# Patient Record
Sex: FEMALE | Race: WHITE | NOT HISPANIC OR LATINO | Employment: FULL TIME | URBAN - METROPOLITAN AREA
[De-identification: names, ages, dates, MRNs, and addresses within clinical notes are randomized per-mention and may not be internally consistent; named-entity substitution may affect disease eponyms.]

---

## 2017-09-13 ENCOUNTER — OFFICE VISIT (OUTPATIENT)
Dept: INTERNAL MEDICINE | Facility: CLINIC | Age: 47
End: 2017-09-13
Payer: COMMERCIAL

## 2017-09-13 VITALS
BODY MASS INDEX: 19.51 KG/M2 | SYSTOLIC BLOOD PRESSURE: 100 MMHG | WEIGHT: 124.31 LBS | TEMPERATURE: 98 F | RESPIRATION RATE: 16 BRPM | HEART RATE: 66 BPM | OXYGEN SATURATION: 99 % | HEIGHT: 67 IN | DIASTOLIC BLOOD PRESSURE: 60 MMHG

## 2017-09-13 DIAGNOSIS — G43.009 NONINTRACTABLE MIGRAINE, UNSPECIFIED MIGRAINE TYPE: Primary | ICD-10-CM

## 2017-09-13 PROCEDURE — 96372 THER/PROPH/DIAG INJ SC/IM: CPT | Mod: S$GLB,,, | Performed by: INTERNAL MEDICINE

## 2017-09-13 PROCEDURE — 99213 OFFICE O/P EST LOW 20 MIN: CPT | Mod: 25,S$GLB,, | Performed by: INTERNAL MEDICINE

## 2017-09-13 PROCEDURE — 3008F BODY MASS INDEX DOCD: CPT | Mod: S$GLB,,, | Performed by: INTERNAL MEDICINE

## 2017-09-13 RX ORDER — AMITRIPTYLINE HYDROCHLORIDE 25 MG/1
25 TABLET, FILM COATED ORAL NIGHTLY
Qty: 30 TABLET | Refills: 11 | Status: SHIPPED | OUTPATIENT
Start: 2017-09-13 | End: 2018-01-05

## 2017-09-13 RX ORDER — KETOROLAC TROMETHAMINE 30 MG/ML
15 INJECTION, SOLUTION INTRAMUSCULAR; INTRAVENOUS
Status: DISCONTINUED | OUTPATIENT
Start: 2017-09-13 | End: 2017-09-13

## 2017-09-13 RX ORDER — KETOROLAC TROMETHAMINE 30 MG/ML
30 INJECTION, SOLUTION INTRAMUSCULAR; INTRAVENOUS
Status: COMPLETED | OUTPATIENT
Start: 2017-09-13 | End: 2017-09-13

## 2017-09-13 RX ORDER — OXYMETAZOLINE HYDROCHLORIDE 1 G/100G
CREAM TOPICAL
Refills: 2 | COMMUNITY
Start: 2017-06-19 | End: 2018-01-05

## 2017-09-13 RX ADMIN — KETOROLAC TROMETHAMINE 30 MG: 30 INJECTION, SOLUTION INTRAMUSCULAR; INTRAVENOUS at 03:09

## 2017-09-13 NOTE — PROGRESS NOTES
"Subjective:      Patient ID: Mandi Juan is a 47 y.o. female.    Chief Complaint: Migraine (3 days); Pain (both eyes, 3 days); Fatigue; and Otalgia (right ear 3 days)    HPI: 47 y.o. White female,  with a  and 2 children who have migraine headaches.  She has had then for years, and can usually manage through one, but for 3-4 days now she has had  A right sided neck tightness, going into occipital area, around to her face and sitting behind right eye.  She took a sudafed this am , and thinks the pain maybe less. ( Also took Ibuprofen ).  Maxalt,caffergot, etc all caused tachycardia, and she felt " horrible".        Review of Systems   Constitutional: Positive for unexpected weight change. Negative for activity change.   HENT: Negative for hearing loss, rhinorrhea and trouble swallowing.    Eyes: Negative for discharge and visual disturbance.   Respiratory: Negative for chest tightness and wheezing.    Cardiovascular: Negative for chest pain and palpitations.   Gastrointestinal: Positive for constipation. Negative for blood in stool, diarrhea and vomiting.   Endocrine: Negative for polydipsia and polyuria.   Genitourinary: Negative for difficulty urinating, dysuria, hematuria and menstrual problem.   Musculoskeletal: Positive for neck pain. Negative for arthralgias and joint swelling.   Skin: Negative.    Neurological: Positive for headaches. Negative for weakness.   Psychiatric/Behavioral: Negative for confusion and dysphoric mood.       Objective:   /60 (BP Location: Right arm, Patient Position: Sitting, BP Method: Large (Manual))   Pulse 66   Temp 97.9 °F (36.6 °C) (Oral)   Resp 16   Ht 5' 7" (1.702 m)   Wt 56.4 kg (124 lb 5.4 oz)   LMP 09/09/2017   SpO2 99%   BMI 19.47 kg/m²     Physical Exam   Constitutional: She is oriented to person, place, and time. She appears well-developed and well-nourished.   HENT:   Head: Normocephalic and atraumatic.   Right Ear: External ear normal. "   Left Ear: External ear normal.   Nose: Nose normal.   Mouth/Throat: Oropharynx is clear and moist.   Eyes: Conjunctivae are normal. Pupils are equal, round, and reactive to light.   Neck: Neck supple.   Cardiovascular: Normal rate, regular rhythm and normal heart sounds.    Pulmonary/Chest: Effort normal and breath sounds normal.   Musculoskeletal: Normal range of motion.   Neurological: She is alert and oriented to person, place, and time.   Skin: Skin is warm and dry.   Psychiatric: She has a normal mood and affect. Her behavior is normal. Judgment and thought content normal.       Assessment:     1. Nonintractable migraine, unspecified migraine type      Plan:     Nonintractable migraine, unspecified migraine type  -     ketorolac injection 15 mg; Inject 15 mg into the vein one time.  -     amitriptyline (ELAVIL) 25 MG tablet; Take 1 tablet (25 mg total) by mouth every evening.  Dispense: 30 tablet; Refill: 11    Migraine avoidance, foods, etc...

## 2017-09-13 NOTE — PATIENT INSTRUCTIONS
Preventing Migraine Headaches: Medicines and Lifestyle Changes     Going to bed and getting up at the same time each day, including weekends, may help prevent migraines.     A migraine is a type of severe headache. Having a migraine can be very painful. But there are steps you can take to help prevent migraines.  Medicines to help prevent migraines  · Your healthcare provider may prescribe certain medicines to help prevent migraines. These medicines may need to be taken daily. Or they may only need to be taken at times when youre likely to have a migraine.  · Common medicines used to help prevent migraines include:  ¨ Triptans (serotonin receptor agonists)  ¨ Nonsteroidal anti-inflammatory drugs (available over-the-counter)  ¨ Beta-blockers  ¨ Anticonvulsants  ¨ Tricyclic antidepressants  ¨ Calcium channel blockers  ¨ Certain vitamins, minerals, and plant extracts  ¨ Botulinum toxin injection (Botox) for certain chronic migraines   ¨ CGRP (calcitonin gene-related peptide) agnonists are being reviewed by the Food and Drug Administration (FDA)  Lifestyle changes for long-term prevention  Here are some suggestions:  · Exercise. Regular exercise can help prevent migraines and improve your health. (If exercise triggers your migraines, talk to your healthcare provider.)  · Keep regular habits. Dont skip or delay meals. Drink plenty of water. And go to bed and get up at about the same time each day. This includes weekends.  · Try alternative treatments. These are treatments that do not involve the use of medicines or surgery. They may help relieve symptoms and prevent migraines. Some treatment options include biofeedback and acupuncture. Ask your healthcare provider to tell you more about these treatments if you have questions.  · Limit caffeine. You may find that caffeine helps relieve pain during an attack. But too much caffeine can also trigger migraines. So, limit the amount of caffeine you consume.  Date Last  Reviewed: 10/11/2015  © 7094-9374 [employer]. [address] [place] [address], [place] [address]. All rights reserved. This information is not intended as a substitute for professional medical care. Always follow your healthcare professional's instructions.        Preventing Migraine Headaches: Triggers     Red wine is a common migraine trigger.     The first step in preventing migraines is to learn what triggers them. You may then be able to control your triggers to avoid or reduce the severity of your migraines.  Know your triggers  Be aware that you may have more than one trigger, and that some triggers may work together. Common migraine triggers include:  · Food and nutrition. Skipping meals or not drinking enough water can trigger headaches. So can certain foods, such as caffeine, monosodium glutamate (MSG), aged cheese, or sausage.  · Alcohol. Red wine and other alcoholic beverages are common migraine triggers.  · Chemicals. Scents, cleaning products, gasoline, glue, perfume, and paint can be triggers. So can tobacco smoke, including secondhand smoke.  · Emotions. Stress can trigger headaches or make them worse once they begin.  · Sleep disruption. Staying up late, sleeping late, and traveling across time zones can disrupt your sleep cycle, triggering headaches.  · Hormones. Many women notice that migraines tend to happen at a certain point in their menstrual cycle. Birth control pills or hormone replacement therapy may also trigger migraines.  · Environment and weather. Air travel, changes in altitude, air pressure changes, hot sun, or bright or flashing lights can be triggers.  Control your triggers  These are some of the things you can do to try to control triggers:  · Avoid triggers if you can. For example, stay clear of alcohol and foods that trigger your headaches. Use unscented household products. Keep regular sleep habits. Manage stress to help control emotional triggers.  · Change your behavior  at times when triggers can't be avoided. For example, make sure to get enough rest and drink plenty of water while you're traveling. Make sure to carry a hat, sunglasses, and your medicines. Be alert for migraine symptoms, so you can treat a migraine early if it happens.  Date Last Reviewed: 10/9/2015  © 5709-9095 [employer]. [address] [place] [address], [place] [address]. All rights reserved. This information is not intended as a substitute for professional medical care. Always follow your healthcare professional's instructions.

## 2017-10-31 ENCOUNTER — OFFICE VISIT (OUTPATIENT)
Dept: NEUROLOGY | Facility: CLINIC | Age: 47
End: 2017-10-31
Payer: COMMERCIAL

## 2017-10-31 VITALS
BODY MASS INDEX: 19.62 KG/M2 | WEIGHT: 125 LBS | HEIGHT: 67 IN | SYSTOLIC BLOOD PRESSURE: 110 MMHG | DIASTOLIC BLOOD PRESSURE: 68 MMHG

## 2017-10-31 DIAGNOSIS — G43.109 MIGRAINE WITH AURA AND WITHOUT STATUS MIGRAINOSUS, NOT INTRACTABLE: Primary | ICD-10-CM

## 2017-10-31 PROCEDURE — 99204 OFFICE O/P NEW MOD 45 MIN: CPT | Mod: S$GLB,,, | Performed by: PSYCHIATRY & NEUROLOGY

## 2017-10-31 PROCEDURE — 99999 PR PBB SHADOW E&M-EST. PATIENT-LVL III: CPT | Mod: PBBFAC,,, | Performed by: PSYCHIATRY & NEUROLOGY

## 2017-10-31 RX ORDER — IBUPROFEN 200 MG
200 TABLET ORAL EVERY 6 HOURS PRN
COMMUNITY
End: 2019-10-22

## 2017-10-31 RX ORDER — FLUTICASONE PROPIONATE 50 UG/1
POWDER, METERED RESPIRATORY (INHALATION)
COMMUNITY
End: 2017-10-31 | Stop reason: CLARIF

## 2017-10-31 RX ORDER — FLUTICASONE PROPIONATE 50 MCG
1 SPRAY, SUSPENSION (ML) NASAL DAILY
COMMUNITY
End: 2022-09-21

## 2017-10-31 RX ORDER — KETOROLAC TROMETHAMINE 10 MG/1
10 TABLET, FILM COATED ORAL 2 TIMES DAILY PRN
Qty: 20 TABLET | Refills: 5 | Status: SHIPPED | OUTPATIENT
Start: 2017-10-31 | End: 2018-04-03

## 2017-10-31 RX ORDER — PROCHLORPERAZINE MALEATE 10 MG
10 TABLET ORAL 2 TIMES DAILY PRN
Qty: 20 TABLET | Refills: 5 | Status: SHIPPED | OUTPATIENT
Start: 2017-10-31 | End: 2023-11-09 | Stop reason: ALTCHOICE

## 2017-10-31 NOTE — PROGRESS NOTES
HPI: Mandi Juan is a 47 y.o. female with headache which started 8 year(s) ago.  Her first headache was with visual aura of scotoma. She also sees jagged mirrors at time.   The pain usually starts in the right more than left temple or  Neck and occipita then frontal region near the eye.   Pain escalates to 8/10 on a pain scale and is described as Throbbing.  Associated symptoms include  light sensitivity and nausea  none. There are not associated neurological sx such as focal weakness or numbness or autonomic symptoms. The pain lasts for day(s). She uses OTC NSAIDs PRN.  She has tried maxalt and relpax and imitrex with jaw tightness and sadness side effects.  Current Frequency is  1 headache every 2-3 weeks which lasts 2-4 days.   No prior MRI brain, but had CT scan 8 years ago which she states was normal.  She uses verapamil for heart flutter  Toradol IM has helped.   She refused to take elavil due to a poor reaction (anxiety) with Zoloft.   Migraines run in her family.   She works as an  for [employer].     Review of Systems   Constitutional: Negative for fever.   HENT: Negative for nosebleeds.    Eyes: Negative for double vision.   Respiratory: Negative for hemoptysis.    Cardiovascular: Negative for leg swelling.   Gastrointestinal: Negative for blood in stool.   Genitourinary: Negative for hematuria.   Musculoskeletal: Negative for falls.   Skin: Negative for rash.   Neurological: Negative for sensory change.   Psychiatric/Behavioral: The patient does not have insomnia.          I have reviewed all of this patient's past medical and surgical histories as well as family and social histories and active allergies and medications as documented in the electronic medical record.      Exam:  Gen Appearance, well developed/nourished in no apparent distress  CV: 2+ distal pulses with no edema or swelling  Neuro:  MS: Awake, alert, oriented to place, person, time, situation. Sustains attention.  Recent/remote memory intact, Language is full to spontaneous speech/naming/comprehension. Fund of Knowledge is full  CN: Optic discs are flat with normal vasculature, PERRL, Extraoccular movements and visual fields are full. Normal facial sensation and strength, Hearing symmetric, Tongue and Palate are midline and strong. Shoulder Shrug symmetric and strong.  Motor: Normal bulk, tone, no abnormal movements. 5/5 strength bilateral upper/lower extremities with 2+ reflexes and no clonus  Sensory: symmetric to pain, temp, and vibration/proprioception. Romberg negative  Cerebellar: Finger-nose,Heal-shin, Rapid alternating movements intact  Gait: Normal stance, no ataxia          Assessment/Plan: Mandi Juan is a 47 y.o. female with an 8 year history of migraine with aura, episodic, not intractable.     I recommend:   1. Toradol (unless worsening GERD or GI upset)- also discussed renal and black box CV risks- don't mix with OTC NSAIDs. For rescue, compazine trial unless abnormal movements or sedation. Notify me if no relief with 3 trials of these medications or intolerable side effects.   2. She refused to take elavil due to a poor reaction (anxiety) with Zoloft and fear of side effects.  She has tried maxalt and relpax and imitrex with jaw tightness and sadness side effects.  3. She uses verapamil for heart flutter which may be acting like a good prevention medication. Continue and avoid further prevention medications unless headache frequency worsens.   4. Avoid brain imaging given her reassuring neuro exam and episodic nature of headaches.   RTC in 4-6 months

## 2018-01-05 ENCOUNTER — OFFICE VISIT (OUTPATIENT)
Dept: INTERNAL MEDICINE | Facility: CLINIC | Age: 48
End: 2018-01-05
Payer: COMMERCIAL

## 2018-01-05 VITALS
BODY MASS INDEX: 20.16 KG/M2 | TEMPERATURE: 98 F | RESPIRATION RATE: 16 BRPM | OXYGEN SATURATION: 98 % | WEIGHT: 128.44 LBS | SYSTOLIC BLOOD PRESSURE: 120 MMHG | DIASTOLIC BLOOD PRESSURE: 72 MMHG | HEIGHT: 67 IN | HEART RATE: 76 BPM

## 2018-01-05 DIAGNOSIS — B97.89 VIRAL CROUP: ICD-10-CM

## 2018-01-05 DIAGNOSIS — J05.0 VIRAL CROUP: ICD-10-CM

## 2018-01-05 DIAGNOSIS — J04.2 LARYNGOTRACHEITIS: Primary | ICD-10-CM

## 2018-01-05 PROCEDURE — 99213 OFFICE O/P EST LOW 20 MIN: CPT | Mod: S$GLB,,, | Performed by: INTERNAL MEDICINE

## 2018-01-05 PROCEDURE — 99999 PR PBB SHADOW E&M-EST. PATIENT-LVL IV: CPT | Mod: PBBFAC,,, | Performed by: INTERNAL MEDICINE

## 2018-01-05 RX ORDER — PREDNISONE 10 MG/1
10 TABLET ORAL DAILY
Qty: 10 TABLET | Refills: 0 | Status: SHIPPED | OUTPATIENT
Start: 2018-01-05 | End: 2018-01-15

## 2018-01-05 RX ORDER — BENZONATATE 200 MG/1
200 CAPSULE ORAL 2 TIMES DAILY PRN
Qty: 20 CAPSULE | Refills: 0 | Status: SHIPPED | OUTPATIENT
Start: 2018-01-05 | End: 2018-01-12

## 2018-01-05 NOTE — PROGRESS NOTES
"Subjective:      Patient ID: Mandi Juan is a 47 y.o. female.    Chief Complaint: Cough (X 6 days); Fatigue; Headache; Sore Throat; Chest Pain (heaviness ); and Pain (both ribs)    HPI: 47 y.o. White female, has had a non stop dry cough, deep in chest, that has given her pain in the  Ribs. She now has a laryngitis, associated with a headache and fatigue.  Codeine, decongestants give her heart palpitations and the jitters.      Review of Systems   Constitutional: Positive for fatigue. Negative for chills, diaphoresis and fever.   HENT: Positive for congestion, rhinorrhea, sinus pressure, sneezing, sore throat and voice change. Negative for ear pain.    Eyes: Negative for photophobia, redness and visual disturbance.   Respiratory: Negative for cough, chest tightness, shortness of breath and wheezing.    Cardiovascular: Negative for chest pain and palpitations.   Gastrointestinal: Negative.    Genitourinary: Negative.    Musculoskeletal: Negative.    Skin: Negative.    Neurological: Positive for headaches.   Hematological: Negative for adenopathy.   Psychiatric/Behavioral: Negative.        Objective:   /72 (BP Location: Left arm, Patient Position: Sitting, BP Method: Medium (Manual))   Pulse 76   Temp 98.2 °F (36.8 °C) (Oral)   Resp 16   Ht 5' 7" (1.702 m)   Wt 58.3 kg (128 lb 6.7 oz)   LMP 12/30/2017   SpO2 98%   BMI 20.11 kg/m²     Physical Exam   Constitutional: She appears well-developed and well-nourished. No distress.   HENT:   Head: Normocephalic and atraumatic.   Right Ear: External ear and ear canal normal. Tympanic membrane is injected, erythematous and retracted.   Left Ear: External ear and ear canal normal. Tympanic membrane is injected, erythematous and retracted.   Nose: Mucosal edema and rhinorrhea present.   Mouth/Throat: Uvula is midline. Mucous membranes are pale. Posterior oropharyngeal edema and posterior oropharyngeal erythema present. Tonsils are 1+ on the right. Tonsils are 1+ on " the left. No tonsillar exudate.   Eyes: Conjunctivae are normal. Pupils are equal, round, and reactive to light.   Neck: Normal range of motion.   Cardiovascular: Regular rhythm and normal heart sounds.    Pulmonary/Chest: Effort normal and breath sounds normal.   Musculoskeletal: She exhibits no edema.   Lymphadenopathy:     She has no cervical adenopathy.   Neurological: She is alert.   Psychiatric: She has a normal mood and affect.   Nursing note and vitals reviewed.      Assessment:     1. Laryngotracheitis    2. Viral croup    Limited treatment due to side effects.    Plan:     Laryngotracheitis  -     predniSONE (DELTASONE) 10 MG tablet; Take 1 tablet (10 mg total) by mouth once daily.  Dispense: 10 tablet; Refill: 0  -     benzonatate (TESSALON) 200 MG capsule; Take 1 capsule (200 mg total) by mouth 2 (two) times daily as needed for Cough.  Dispense: 20 capsule; Refill: 0    Viral croup    Fluids,rest,isolation,time.  If no better 5 days plz return.

## 2018-03-03 ENCOUNTER — OFFICE VISIT (OUTPATIENT)
Dept: URGENT CARE | Facility: CLINIC | Age: 48
End: 2018-03-03
Payer: COMMERCIAL

## 2018-03-03 VITALS
HEART RATE: 68 BPM | DIASTOLIC BLOOD PRESSURE: 75 MMHG | WEIGHT: 128 LBS | OXYGEN SATURATION: 98 % | SYSTOLIC BLOOD PRESSURE: 120 MMHG | TEMPERATURE: 97 F | BODY MASS INDEX: 20.09 KG/M2 | HEIGHT: 67 IN | RESPIRATION RATE: 18 BRPM

## 2018-03-03 DIAGNOSIS — N30.00 ACUTE CYSTITIS WITHOUT HEMATURIA: Primary | ICD-10-CM

## 2018-03-03 DIAGNOSIS — N76.1 SUBACUTE VAGINITIS: ICD-10-CM

## 2018-03-03 LAB
BILIRUB UR QL STRIP: POSITIVE
GLUCOSE UR QL STRIP: NEGATIVE
KETONES UR QL STRIP: NEGATIVE
LEUKOCYTE ESTERASE UR QL STRIP: NEGATIVE
PH, POC UA: 6 (ref 5–8)
POC BLOOD, URINE: NEGATIVE
POC NITRATES, URINE: POSITIVE
PROT UR QL STRIP: POSITIVE
SP GR UR STRIP: 1.01 (ref 1–1.03)
UROBILINOGEN UR STRIP-ACNC: ABNORMAL (ref 0.1–1.1)

## 2018-03-03 PROCEDURE — 81003 URINALYSIS AUTO W/O SCOPE: CPT | Mod: QW,S$GLB,, | Performed by: EMERGENCY MEDICINE

## 2018-03-03 PROCEDURE — 99214 OFFICE O/P EST MOD 30 MIN: CPT | Mod: 25,S$GLB,, | Performed by: EMERGENCY MEDICINE

## 2018-03-03 RX ORDER — PHENAZOPYRIDINE HYDROCHLORIDE 200 MG/1
200 TABLET, FILM COATED ORAL 3 TIMES DAILY PRN
Qty: 6 TABLET | Refills: 0 | Status: SHIPPED | OUTPATIENT
Start: 2018-03-03 | End: 2018-03-05

## 2018-03-03 RX ORDER — FLUCONAZOLE 150 MG/1
150 TABLET ORAL ONCE
Qty: 1 TABLET | Refills: 0 | Status: SHIPPED | OUTPATIENT
Start: 2018-03-03 | End: 2018-03-03

## 2018-03-03 RX ORDER — SULFAMETHOXAZOLE AND TRIMETHOPRIM 800; 160 MG/1; MG/1
1 TABLET ORAL 2 TIMES DAILY
Qty: 10 TABLET | Refills: 0 | Status: SHIPPED | OUTPATIENT
Start: 2018-03-03 | End: 2018-03-08

## 2018-03-03 NOTE — PATIENT INSTRUCTIONS
"Enoch Levi MD  Go to the Emergency Department for any problems  Call your PCP for follow up next available.    Bladder Infection, Female (Adult)    Urine is normally doesn't have any bacteria in it. But bacteria can get into the urinary tract from the skin around the rectum. Or they can travel in the blood from elsewhere in the body. Once they are in your urinary tract, they can cause infection in the urethra (urethritis), the bladder (cystitis), or the kidneys (pyelonephritis).  The most common place for an infection is in the bladder. This is called a bladder infection. This is one of the most common infections in women. Most bladder infections are easily treated. They are not serious unless the infection spreads to the kidney.  The phrases "bladder infection," "UTI," and "cystitis" are often used to describe the same thing. But they are not always the same. Cystitis is an inflammation of the bladder. The most common cause of cystitis is an infection.  Symptoms  The infection causes inflammation in the urethra and bladder. This causes many of the symptoms. The most common symptoms of a bladder infection are:  · Pain or burning when urinating  · Having to urinate more often than usual  · Urgent need to urinate  · Only a small amount of urine comes out  · Blood in urine  · Abdominal discomfort. This is usually in the lower abdomen above the pubic bone.  · Cloudy urine  · Strong- or bad-smelling urine  · Unable to urinate (urinary retention)  · Unable to hold urine in (urinary incontinence)  · Fever  · Loss of appetite  · Confusion (in older adults)  Causes  Bladder infections are not contagious. You can't get one from someone else, from a toilet seat, or from sharing a bath.  The most common cause of bladder infections is bacteria from the bowels. The bacteria get onto the skin around the opening of the urethra. From there, they can get into the urine and travel up to the bladder, causing inflammation and " infection. This usually happens because of:  · Wiping improperly after urinating. Always wipe from front to back.  · Bowel incontinence  · Pregnancy  · Procedures such as having a catheter inserted  · Older age  · Not emptying your bladder. This can allow bacteria a chance to grow in your urine.  · Dehydration  · Constipation  · Sex  · Use of a diaphragm for birth control   Treatment  Bladder infections are diagnosed by a urine test. They are treated with antibiotics and usually clear up quickly without complications. Treatment helps prevent a more serious kidney infection.  Medicines  Medicines can help in the treatment of a bladder infection:  · Take antibiotics until they are used up, even if you feel better. It is important to finish them to make sure the infection has cleared.  · You can use acetaminophen or ibuprofen for pain, fever, or discomfort, unless another medicine was prescribed. If you have chronic liver or kidney disease, talk with your healthcare provider before using these medicines. Also talk with your provider if you've ever had a stomach ulcer or gastrointestinal bleeding, or are taking blood-thinner medicines.  · If you are given phenazopydridine to reduce burning with urination, it will cause your urine to become a bright orange color. This can stain clothing.  Care and prevention  These self-care steps can help prevent future infections:  · Drink plenty of fluids to prevent dehydration and flush out your bladder. Do this unless you must restrict fluids for other health reasons, or your doctor told you not to.  · Proper cleaning after going to the bathroom is important. Wipe from front to back after using the toilet to prevent the spread of bacteria.  · Urinate more often. Don't try to hold urine in for a long time.  · Wear loose-fitting clothes and cotton underwear. Avoid tight-fitting pants.  · Improve your diet and prevent constipation. Eat more fresh fruit and vegetables, and fiber, and  less junk and fatty foods.  · Avoid sex until your symptoms are gone.  · Avoid caffeine, alcohol, and spicy foods. These can irritate your bladder.  · Urinate right after intercourse to flush out your bladder.  · If you use birth control pills and have frequent bladder infections, discuss it with your doctor.  Follow-up care  Call your healthcare provider if all symptoms are not gone after 3 days of treatment. This is especially important if you have repeat infections.  If a culture was done, you will be told if your treatment needs to be changed. If directed, you can call to find out the results.  If X-rays were done, you will be told if the results will affect your treatment.  Call 911  Call 911 if any of the following occur:  · Trouble breathing  · Hard to wake up or confusion  · Fainting or loss of consciousness  · Rapid heart rate  When to seek medical advice  Call your healthcare provider right away if any of these occur:  · Fever of 100.4ºF (38.0ºC) or higher, or as directed by your healthcare provider  · Symptoms are not better by the third day of treatment  · Back or belly (abdominal) pain that gets worse  · Repeated vomiting, or unable to keep medicine down  · Weakness or dizziness  · Vaginal discharge  · Pain, redness, or swelling in the outer vaginal area (labia)  Date Last Reviewed: 10/1/2016  © 1565-7696 [employer]. [address] [place] [address], [place] [address]. All rights reserved. This information is not intended as a substitute for professional medical care. Always follow your healthcare professional's instructions.        Preventing Vaginitis     Use mild, unscented soap when you bathe or shower to avoid irritating your vagina.    Vaginitis is irritation or infection of the vagina or vulva (the outside opening of the vagina). Vaginitis can be caused by bacteria, viruses, parasites, or yeast. Chemicals (such as in perfumes or soaps or in spermicides) can sometimes be a cause. Vaginitis  can be caused by hormone changes in pregnancy or with menopause. You can help prevent vaginitis. Follow the tips below. And see your healthcare provider if you have any symptoms.  Hygiene  · Avoid chemicals. Do not use vaginal sprays. Do not use scented toilet paper or tampons that are scented. Sprays and scents have chemicals that can irritate your vagina.  · Do not douche unless you are told to by your healthcare provider. Douching is rarely needed. And it upsets the normal balance in the vagina.  · Wash yourself well. Wash the outer vaginal area (vulva) every day with mild, unscented soap. Keep it as dry as possible.  · Wipe correctly. Make sure to wipe from front to back after a bowel movement. This helps keep from spreading bacteria from your anus to your vagina.  · Change your tampon often. During your period, make sure to change your tampon as often as directed on the package. This allows the normal flow of vaginal discharge and blood.  Lifestyle  · Limit your number of sexual partners. The more partners you have, the greater your risk of infection. Using condoms helps reduce your risk.  · Get enough sleep. Sleep helps keep your bodys immune system healthy. This helps you fight infection.  · Lose weight, if needed. Excess weight can reduce air circulation around your vagina. This can increase your risk of infection.  · Exercise regularly. Regular activity helps keep your body healthy.  · Take antibiotics only as directed. Antibiotics can change the normal chemical balance in the vagina.    Clothing  · Dont sit in wet clothes. Yeast thrives when its warm and damp.  · Dont wear tight pants. And dont wear tights, leggings, or hose without a cotton crotch. These types of clothing trap warmth and moisture.  · Wear cotton underwear. Cotton lets air circulate around the vagina.  Symptoms of vaginitis  · Irritation, swelling, or itching of the genital area  · Vaginal discharge  · Bad vaginal odor  · Pain or  burning during urination   Date Last Reviewed: 12/1/2016  © 2069-2739 [employer]. [address] [place] [address], [place] [address]. All rights reserved. This information is not intended as a substitute for professional medical care. Always follow your healthcare professional's instructions.

## 2018-03-03 NOTE — PROGRESS NOTES
"Subjective:       Patient ID: Mandi Juan is a 47 y.o. female.    Vitals:  height is 5' 7" (1.702 m) and weight is 58.1 kg (128 lb). Her oral temperature is 97.3 °F (36.3 °C). Her blood pressure is 120/75 and her pulse is 68. Her respiration is 18 and oxygen saturation is 98%.     Chief Complaint: Dysuria    Few hr hx sharp bladder area pains and discomfort at end of urination/urgency, no fever/back pain/vag Dc, hx of same, OK with Bactrim, occas vaginitis with antibiotic use, took azo PTA, NOC.      Dysuria    This is a recurrent problem. The current episode started today. The problem has been unchanged. The quality of the pain is described as aching. The pain is at a severity of 8/10. The pain is mild. There has been no fever. Associated symptoms include frequency and urgency. Pertinent negatives include no chills, hematuria, nausea or vomiting. Treatments tried: azo. The treatment provided no relief.     Review of Systems   Constitution: Negative for chills and fever.   Skin: Negative for itching.   Musculoskeletal: Negative for back pain.   Gastrointestinal: Negative for abdominal pain, nausea and vomiting.   Genitourinary: Positive for dysuria, frequency and urgency. Negative for genital sores, hematuria, missed menses and non-menstrual bleeding.       Objective:      Physical Exam   Constitutional: She is oriented to person, place, and time. She appears well-developed and well-nourished.   HENT:   Head: Normocephalic and atraumatic.   Neck: Normal range of motion. Neck supple.   Cardiovascular: Normal rate, regular rhythm and normal heart sounds.    Pulmonary/Chest: Breath sounds normal.   Abdominal: There is no guarding.   Mild bladder discomfort to palp, no bilat CVAT to palp   Musculoskeletal: Normal range of motion.   Neurological: She is alert and oriented to person, place, and time.   Skin: Skin is warm and dry.   Psychiatric: She has a normal mood and affect. Her behavior is normal.       Assessment: "       1. Acute cystitis without hematuria    2. Subacute vaginitis        Plan:         Acute cystitis without hematuria  -     POCT Urinalysis, Dipstick, Automated, W/O Scope  -     sulfamethoxazole-trimethoprim 800-160mg (BACTRIM DS) 800-160 mg Tab; Take 1 tablet by mouth 2 (two) times daily.  Dispense: 10 tablet; Refill: 0  -     phenazopyridine (PYRIDIUM) 200 MG tablet; Take 1 tablet (200 mg total) by mouth 3 (three) times daily as needed for Pain.  Dispense: 6 tablet; Refill: 0    Subacute vaginitis  -     fluconazole (DIFLUCAN) 150 MG Tab; Take 1 tablet (150 mg total) by mouth once. Take one time as needed for vaginal yeast infection  Dispense: 1 tablet; Refill: 0      Enoch Levi MD  Go to the Emergency Department for any problems  Call your PCP for follow up next available.

## 2018-03-06 ENCOUNTER — TELEPHONE (OUTPATIENT)
Dept: URGENT CARE | Facility: CLINIC | Age: 48
End: 2018-03-06

## 2018-04-03 ENCOUNTER — OFFICE VISIT (OUTPATIENT)
Dept: NEUROLOGY | Facility: CLINIC | Age: 48
End: 2018-04-03
Payer: COMMERCIAL

## 2018-04-03 VITALS
WEIGHT: 125.44 LBS | RESPIRATION RATE: 16 BRPM | BODY MASS INDEX: 19.69 KG/M2 | SYSTOLIC BLOOD PRESSURE: 100 MMHG | DIASTOLIC BLOOD PRESSURE: 66 MMHG | HEIGHT: 67 IN | HEART RATE: 74 BPM

## 2018-04-03 DIAGNOSIS — G43.109 MIGRAINE WITH AURA AND WITHOUT STATUS MIGRAINOSUS, NOT INTRACTABLE: Primary | ICD-10-CM

## 2018-04-03 PROCEDURE — 99999 PR PBB SHADOW E&M-EST. PATIENT-LVL III: CPT | Mod: PBBFAC,,, | Performed by: PSYCHIATRY & NEUROLOGY

## 2018-04-03 PROCEDURE — 99214 OFFICE O/P EST MOD 30 MIN: CPT | Mod: S$GLB,,, | Performed by: PSYCHIATRY & NEUROLOGY

## 2018-04-03 RX ORDER — DICLOFENAC POTASSIUM 50 MG/1
1 POWDER, FOR SOLUTION ORAL DAILY PRN
Qty: 9 EACH | Refills: 11 | Status: SHIPPED | OUTPATIENT
Start: 2018-04-03 | End: 2018-04-05

## 2018-04-03 NOTE — PROGRESS NOTES
HPI: Mandi Juan is a 47 y.o. female with over 8 year history of migraine with aura, episodic, not intractable.   Since the last visit she tried Toradol and compazine PRN for headaches  She states she has to use both medications in combination but has good relief with this. She feels she has good relief with compazine compared to Toradol. She will get relief within an hour.   She is taking 2 days of headache medications per month.  Some headaches are more on the left and feels like her face is throbbing and these are severe and last hours and are severe and just in a different location as opposed the headaches that start more posteriorly. She has no autonomic symptoms. These are not getting more frequent. She does often use ibuprofen  instead of Rx meds  She has not had days of headaches or more frequent headaches.     Review of Systems   Constitutional: Negative for fever.   HENT: Negative for nosebleeds.    Eyes: Negative for double vision.   Respiratory: Negative for hemoptysis.    Cardiovascular: Negative for leg swelling.   Gastrointestinal: Negative for blood in stool.   Genitourinary: Negative for hematuria.   Skin: Negative for rash.   Neurological: Positive for headaches.   Endo/Heme/Allergies: Does not bruise/bleed easily.   Psychiatric/Behavioral: Negative for memory loss.         I have reviewed all of this patient's past medical and surgical histories as well as family and social histories and active allergies and medications as documented in the electronic medical record.    Exam:  Gen Appearance, well developed/nourished in no apparent distress  CV: 2+ distal pulses with no edema or swelling  Neuro:  MS: Awake, alert, oriented to place, person, time, situation. Sustains attention. Recent/remote memory intact, Language is full to spontaneous speech/naming/comprehension. Fund of Knowledge is full  CN: Optic discs are flat with normal vasculature, PERRL, Extraoccular movements and visual fields are  full. Normal facial sensation and strength, Hearing symmetric, Tongue and Palate are midline and strong. Shoulder Shrug symmetric and strong.  Motor: Normal bulk, tone, no abnormal movements. 5/5 strength bilateral upper/lower extremities with 2+ reflexes and no clonus  Sensory: symmetric to pain, temp, and vibration/proprioception. Romberg negative  Cerebellar: Finger-nose,Heal-shin, Rapid alternating movements intact  Gait: Normal stance, no ataxia      Assessment/Plan: Mandi Juan is a 47 y.o. female with over 8 year history of migraine with aura, episodic, not intractable.     I recommend:   1. Toradol with Compazine in combination helps. Compazine helps her more.Try taking compazine  more at the onset of the headaches. Try diclofenac powder instead of Toradol unless NSAID side effects as discussed at the last visit.  Don't use with ibuprofen.   2. Reassured about more left sided headaches (this unilateral pattern is more common in migraine). No imaging needed as long as this does worsen/ become more frequent.   3. Previously, she refused to take elavil due to a poor reaction (anxiety) with Zoloft and fear of side effects.  She has tried maxalt and relpax and imitrex with jaw tightness and sadness side effects.  4. She uses verapamil for heart flutter which may be acting like a good prevention medication. Continue and avoid further prevention medications unless headache frequency worsens.   RTC 6 months

## 2018-04-05 ENCOUNTER — TELEPHONE (OUTPATIENT)
Dept: NEUROLOGY | Facility: CLINIC | Age: 48
End: 2018-04-05

## 2018-04-05 RX ORDER — DICLOFENAC POTASSIUM 50 MG/1
50 TABLET, FILM COATED ORAL 2 TIMES DAILY PRN
Qty: 20 TABLET | Refills: 3 | Status: SHIPPED | OUTPATIENT
Start: 2018-04-05 | End: 2018-10-12

## 2018-04-12 ENCOUNTER — OFFICE VISIT (OUTPATIENT)
Dept: INTERNAL MEDICINE | Facility: CLINIC | Age: 48
End: 2018-04-12
Payer: COMMERCIAL

## 2018-04-12 VITALS
BODY MASS INDEX: 20.03 KG/M2 | SYSTOLIC BLOOD PRESSURE: 102 MMHG | DIASTOLIC BLOOD PRESSURE: 68 MMHG | OXYGEN SATURATION: 99 % | HEIGHT: 67 IN | WEIGHT: 127.63 LBS | HEART RATE: 78 BPM

## 2018-04-12 DIAGNOSIS — M75.52 ACUTE BURSITIS OF LEFT SHOULDER: ICD-10-CM

## 2018-04-12 DIAGNOSIS — Z82.61 FAMILY HISTORY OF RHEUMATOID ARTHRITIS: ICD-10-CM

## 2018-04-12 DIAGNOSIS — M19.90 ARTHRITIS: Primary | ICD-10-CM

## 2018-04-12 PROCEDURE — 99999 PR PBB SHADOW E&M-EST. PATIENT-LVL III: CPT | Mod: PBBFAC,,, | Performed by: INTERNAL MEDICINE

## 2018-04-12 PROCEDURE — 99213 OFFICE O/P EST LOW 20 MIN: CPT | Mod: S$GLB,,, | Performed by: INTERNAL MEDICINE

## 2018-04-12 NOTE — PROGRESS NOTES
"Subjective:      Patient ID: Mandi Juan is a 47 y.o. female.    Chief Complaint: Hand Pain (Several Mths  ) and Shoulder Pain (left Shoulder)    HPI: 47 y.o. White female , with a strong family history of Rheumatoid arthritis.  She noticed episodic pain in her left MTP first joint, and then pain in the DIP joints   od left hand. This can come on without any trauma,exercise or extra use.  States it does not produce any redness,swelling; just painful.  And she has pain in her left shoulder, not limiting ROM  Tries not to take too much Ibuprofen because of her stomach.  She did see Dr. Colbert ( neuro) for her migraines, and was placed on toradol  And compazine, because all the other meds gave her side effects.         Review of Systems   Constitutional: Negative.    HENT: Negative.    Eyes: Negative.    Respiratory: Negative.    Cardiovascular: Negative.    Gastrointestinal: Negative.    Genitourinary: Negative.    Musculoskeletal: Negative.    Skin: Negative.    Neurological: Negative.    Hematological: Negative.    Psychiatric/Behavioral: Negative.        Objective:   /68 (BP Location: Left arm, Patient Position: Sitting)   Pulse 78   Ht 5' 7" (1.702 m)   Wt 57.9 kg (127 lb 10.3 oz)   SpO2 99%   BMI 19.99 kg/m²     Physical Exam   Constitutional: She is oriented to person, place, and time. She appears well-developed and well-nourished.   HENT:   Head: Normocephalic and atraumatic.   Right Ear: External ear normal.   Left Ear: External ear normal.   Nose: Nose normal.   Mouth/Throat: Oropharynx is clear and moist.   Eyes: Conjunctivae and EOM are normal. Pupils are equal, round, and reactive to light.   Neck: Normal range of motion. Neck supple.   Cardiovascular: Normal rate, regular rhythm and normal heart sounds.    Pulmonary/Chest: Effort normal and breath sounds normal.   Abdominal: Soft. Bowel sounds are normal.   Musculoskeletal: Normal range of motion.        Left shoulder: She exhibits " tenderness and pain. She exhibits no swelling, no crepitus and no spasm.        Arms:  Neurological: She is alert and oriented to person, place, and time.   Skin: Skin is warm and dry.   Psychiatric: She has a normal mood and affect.   Nursing note and vitals reviewed.      Assessment:     1. Arthritis    2. Family history of rheumatoid arthritis    3. Acute bursitis of left shoulder    She has seen Ines Powers ( ortho) in the past and wants to follow up with him.  Will check serology  For RA.  Plan:     Arthritis  -     C-reactive protein; Future; Expected date: 04/12/2018  -     Sedimentation rate, manual; Future; Expected date: 04/12/2018  -     Rheumatoid factor; Future; Expected date: 04/12/2018  -     CBC auto differential; Future; Expected date: 04/12/2018  -     Ambulatory referral to Orthopedics    Family history of rheumatoid arthritis  -     C-reactive protein; Future; Expected date: 04/12/2018  -     Sedimentation rate, manual; Future; Expected date: 04/12/2018  -     Rheumatoid factor; Future; Expected date: 04/12/2018  -     CBC auto differential; Future; Expected date: 04/12/2018  -     Ambulatory referral to Orthopedics    Acute bursitis of left shoulder

## 2018-08-29 DIAGNOSIS — Z12.31 VISIT FOR SCREENING MAMMOGRAM: Primary | ICD-10-CM

## 2018-10-12 ENCOUNTER — OFFICE VISIT (OUTPATIENT)
Dept: NEUROLOGY | Facility: CLINIC | Age: 48
End: 2018-10-12
Payer: COMMERCIAL

## 2018-10-12 ENCOUNTER — LAB VISIT (OUTPATIENT)
Dept: LAB | Facility: HOSPITAL | Age: 48
End: 2018-10-12
Attending: PSYCHIATRY & NEUROLOGY
Payer: COMMERCIAL

## 2018-10-12 VITALS
SYSTOLIC BLOOD PRESSURE: 92 MMHG | WEIGHT: 129 LBS | HEART RATE: 80 BPM | HEIGHT: 67 IN | BODY MASS INDEX: 20.25 KG/M2 | RESPIRATION RATE: 14 BRPM | DIASTOLIC BLOOD PRESSURE: 66 MMHG

## 2018-10-12 DIAGNOSIS — Z82.49 FAMILY HISTORY OF CEREBRAL ANEURYSM: ICD-10-CM

## 2018-10-12 DIAGNOSIS — G44.89 CHRONIC MIXED HEADACHE SYNDROME: ICD-10-CM

## 2018-10-12 DIAGNOSIS — R51.9 LEFT-SIDED HEADACHE: ICD-10-CM

## 2018-10-12 DIAGNOSIS — G43.109 MIGRAINE WITH AURA AND WITHOUT STATUS MIGRAINOSUS, NOT INTRACTABLE: Primary | ICD-10-CM

## 2018-10-12 LAB
CREAT SERPL-MCNC: 1 MG/DL
EST. GFR  (AFRICAN AMERICAN): >60 ML/MIN/1.73 M^2
EST. GFR  (NON AFRICAN AMERICAN): >60 ML/MIN/1.73 M^2

## 2018-10-12 PROCEDURE — 99214 OFFICE O/P EST MOD 30 MIN: CPT | Mod: S$GLB,,, | Performed by: PSYCHIATRY & NEUROLOGY

## 2018-10-12 PROCEDURE — 82565 ASSAY OF CREATININE: CPT

## 2018-10-12 PROCEDURE — 3008F BODY MASS INDEX DOCD: CPT | Mod: CPTII,S$GLB,, | Performed by: PSYCHIATRY & NEUROLOGY

## 2018-10-12 PROCEDURE — 99999 PR PBB SHADOW E&M-EST. PATIENT-LVL III: CPT | Mod: PBBFAC,,, | Performed by: PSYCHIATRY & NEUROLOGY

## 2018-10-12 PROCEDURE — 36415 COLL VENOUS BLD VENIPUNCTURE: CPT

## 2018-10-12 RX ORDER — DICLOFENAC POTASSIUM 50 MG/1
1 POWDER, FOR SOLUTION ORAL DAILY PRN
Qty: 9 EACH | Refills: 11 | Status: SHIPPED | OUTPATIENT
Start: 2018-10-12 | End: 2020-11-09

## 2018-10-12 RX ORDER — CYCLOSPORINE 0.5 MG/ML
EMULSION OPHTHALMIC
COMMUNITY
Start: 2018-09-10 | End: 2019-04-16

## 2018-10-12 RX ORDER — METHYLPREDNISOLONE 4 MG/1
TABLET ORAL
Qty: 1 PACKAGE | Refills: 0 | Status: SHIPPED | OUTPATIENT
Start: 2018-10-12 | End: 2018-11-02

## 2018-10-12 NOTE — PROGRESS NOTES
"HPI: Mandi Juan is a 48 y.o. female with over 8 year history of migraine with aura, episodic, not intractable.       Since the last visit, the patient's insurance mandated she try diclofenac tablet prior to powder.    The patient states her headache have "changed." She states they are more on her left side on the left face. She did disclose that at the last visit. Previously, her headaches had been more on the right side. These were episodic, but this past week, she has had one week of left sided headaches.  She does have relief with dicofenac and compazine but does get some fatigue with compazine. She no longer has headaches on the right.   No autonomic symptoms with headache.   She has a family history of cerebral aneurysm in her father's brother and perhaps her father's brother.       Review of Systems   Constitutional: Negative for fever.   HENT: Negative for nosebleeds.    Eyes: Negative for double vision.   Respiratory: Negative for hemoptysis.    Cardiovascular: Negative for leg swelling.   Gastrointestinal: Negative for blood in stool.   Genitourinary: Negative for hematuria.   Skin: Negative for rash.   Neurological: Positive for headaches.   Endo/Heme/Allergies: Does not bruise/bleed easily.   Psychiatric/Behavioral: Negative for memory loss.         I have reviewed all of this patient's past medical and surgical histories as well as family and social histories and active allergies and medications as documented in the electronic medical record.    Exam:  Gen Appearance, well developed/nourished in no apparent distress  CV: 2+ distal pulses with no edema or swelling  Neuro:  MS: Awake, alert, oriented to place, person, time, situation. Sustains attention. Recent/remote memory intact, Language is full to spontaneous speech/naming/comprehension. Fund of Knowledge is full  CN: Optic discs are flat with normal vasculature, PERRL, Extraoccular movements and visual fields are full. Normal facial sensation and " strength, Hearing symmetric, Tongue and Palate are midline and strong. Shoulder Shrug symmetric and strong.  Motor: Normal bulk, tone, no abnormal movements. 5/5 strength bilateral upper/lower extremities with 2+ reflexes and no clonus  Sensory: symmetric to pain, temp, and vibration/proprioception. Romberg negative  Cerebellar: Finger-nose,Heal-shin, Rapid alternating movements intact  Gait: Normal stance, no ataxia      Assessment/Plan: Mandi Juan is a 48 y.o. female with over 8 year history of migraine with aura, episodic, not intractable.     I recommend:   1. CTA head needed: Her headaches are now side locked on the left and she has a family history of cerebral aneurysm.   2. Otherwise, spells are consistent with migraine, and she has has a prolonged headache for one week.   3. Medrol mio per orders for intractable headache times one week. Continue home dose protonix.   4. She will continue verapamil for heart flutter which may be acting like a good prevention medication. Continue and avoid further prevention medications unless headache frequency fails to improve in the next 2-3 weeks (she will notify me if so).  5. Diclofenac helps only in combination with compazine which causes drowsiness. Compazine in combination helps. Diclofenac helps better than Toradol. She would benefit from powder form of diclofenac instead per orders.   6. Previously, she refused to take elavil due to a poor reaction (anxiety) with Zoloft and fear of side effects.  She has tried maxalt and relpax and imitrex with jaw tightness and sadness side effects.    RTC 6 months

## 2018-10-18 ENCOUNTER — HOSPITAL ENCOUNTER (OUTPATIENT)
Dept: RADIOLOGY | Facility: HOSPITAL | Age: 48
Discharge: HOME | End: 2018-10-18
Attending: PSYCHIATRY & NEUROLOGY
Payer: COMMERCIAL

## 2018-10-18 DIAGNOSIS — G44.89 CHRONIC MIXED HEADACHE SYNDROME: ICD-10-CM

## 2018-10-18 DIAGNOSIS — R51.9 LEFT-SIDED HEADACHE: ICD-10-CM

## 2018-10-18 PROCEDURE — 70496 CT ANGIOGRAPHY HEAD: CPT | Mod: TC

## 2018-10-18 PROCEDURE — 25500020 PHARM REV CODE 255: Performed by: PSYCHIATRY & NEUROLOGY

## 2018-10-18 PROCEDURE — 70496 CT ANGIOGRAPHY HEAD: CPT | Mod: 26,,, | Performed by: RADIOLOGY

## 2018-10-18 RX ADMIN — IOHEXOL 75 ML: 350 INJECTION, SOLUTION INTRAVENOUS at 02:10

## 2018-11-07 ENCOUNTER — TELEPHONE (OUTPATIENT)
Dept: NEUROLOGY | Facility: CLINIC | Age: 48
End: 2018-11-07

## 2018-11-07 NOTE — TELEPHONE ENCOUNTER
----- Message from Jaja Miller sent at 2018 11:55 AM CST -----  Contact: PATIENT  Mandi Juan  MRN: [id number]  : 1970  PCP: Rc Myers  Home Phone      [phone]  Work Phone      Not on file.  Mobile          [phone]      MESSAGE: Patient states that since she has been taking the Cataflam her tongue has been turning white & has a tingling to it, is this something that she should be worried about because Dr. Colbert is wanting to prescribe Cambia for her and she is not wanting to spend the money if it will have the same affect.        Phone: [phone]

## 2018-11-08 RX ORDER — BUTALBITAL, ACETAMINOPHEN AND CAFFEINE 50; 325; 40 MG/1; MG/1; MG/1
1 TABLET ORAL 2 TIMES DAILY PRN
Qty: 15 TABLET | Refills: 2 | Status: SHIPPED | OUTPATIENT
Start: 2018-11-08 | End: 2018-12-08

## 2018-11-08 NOTE — TELEPHONE ENCOUNTER
Patient states that she is willing to try the Fioricet, she states that if she tried this it was a very long time ago. Also wants to know if she should take the Compazine with the Fioricet when she has a really bad Migraine?  Since the side effect with the Diclofenac she is asking if its ok for her to still take Ibuprofen.

## 2018-11-08 NOTE — TELEPHONE ENCOUNTER
She can take Compazine with Fioricet.  She should avoid using ibuprofen for a few weeks, but then can try it again unless it causes the same side effect. It may not.  Fioricet Rx sent.

## 2018-11-08 NOTE — TELEPHONE ENCOUNTER
I would discontinue diclofenac and would avoid using cambia which could cause the same side effect.  Has she ever used Fioricet to stop headaches prior? It is not something I prescribe frequently in headache patients because if used too much, it can cause rebound headache. However, if she uses it only a few times per month, it can be effective. She has had side effects with other meds, so I think it is appropriate to try is she never tried it. Let me know, please. She would just have to watch for sleepiness with use.

## 2019-04-16 ENCOUNTER — OFFICE VISIT (OUTPATIENT)
Dept: NEUROLOGY | Facility: CLINIC | Age: 49
End: 2019-04-16
Payer: COMMERCIAL

## 2019-04-16 VITALS
DIASTOLIC BLOOD PRESSURE: 70 MMHG | HEIGHT: 66 IN | BODY MASS INDEX: 20.94 KG/M2 | WEIGHT: 130.31 LBS | HEART RATE: 76 BPM | RESPIRATION RATE: 16 BRPM | SYSTOLIC BLOOD PRESSURE: 112 MMHG

## 2019-04-16 DIAGNOSIS — G44.89 CHRONIC MIXED HEADACHE SYNDROME: Primary | ICD-10-CM

## 2019-04-16 DIAGNOSIS — R20.9 SENSATION DISTURBANCE OF SKIN: ICD-10-CM

## 2019-04-16 DIAGNOSIS — K14.8 TONGUE DISCOLORATION: ICD-10-CM

## 2019-04-16 PROCEDURE — 99999 PR PBB SHADOW E&M-EST. PATIENT-LVL III: ICD-10-PCS | Mod: PBBFAC,,, | Performed by: PSYCHIATRY & NEUROLOGY

## 2019-04-16 PROCEDURE — 3008F PR BODY MASS INDEX (BMI) DOCUMENTED: ICD-10-PCS | Mod: CPTII,S$GLB,, | Performed by: PSYCHIATRY & NEUROLOGY

## 2019-04-16 PROCEDURE — 3008F BODY MASS INDEX DOCD: CPT | Mod: CPTII,S$GLB,, | Performed by: PSYCHIATRY & NEUROLOGY

## 2019-04-16 PROCEDURE — 99999 PR PBB SHADOW E&M-EST. PATIENT-LVL III: CPT | Mod: PBBFAC,,, | Performed by: PSYCHIATRY & NEUROLOGY

## 2019-04-16 PROCEDURE — 99214 OFFICE O/P EST MOD 30 MIN: CPT | Mod: S$GLB,,, | Performed by: PSYCHIATRY & NEUROLOGY

## 2019-04-16 PROCEDURE — 99214 PR OFFICE/OUTPT VISIT, EST, LEVL IV, 30-39 MIN: ICD-10-PCS | Mod: S$GLB,,, | Performed by: PSYCHIATRY & NEUROLOGY

## 2019-04-16 NOTE — PROGRESS NOTES
HPI: Mandi Juan is a 48 y.o. female with over 8 year history of migraine with aura, episodic, not intractable.       Medrol mio helped after the last visit.    She thought Diclofenac caused some tongue tingling and discoloration of the tongue (white) but this continues without diclofenac with some spots. She had an ENT appointment for and states scoping looked good. She was given medication for yeast    She has not tried fioricet to date.  She is having headaches that are either right  or left sided now which is the entire face. These all occur about once per week on average.   No autonomic symptoms.     She states she has had a few episodes of tingling and running sensation down her right leg without pain or sciatica symptoms. She has a known meningocele at the sacral level followed by Dr Wynne at  prior.    Review of Systems   Constitutional: Negative for fever.   HENT: Negative for nosebleeds.    Eyes: Negative for double vision.   Respiratory: Negative for hemoptysis.    Cardiovascular: Negative for leg swelling.   Gastrointestinal: Negative for blood in stool.   Genitourinary: Negative for hematuria.   Skin: Negative for rash.   Neurological: Positive for headaches.   Endo/Heme/Allergies: Does not bruise/bleed easily.   Psychiatric/Behavioral: Negative for memory loss.         I have reviewed all of this patient's past medical and surgical histories as well as family and social histories and active allergies and medications as documented in the electronic medical record.    Exam:  Gen Appearance, well developed/nourished in no apparent distress  CV: 2+ distal pulses with no edema or swelling  Neuro:  MS: Awake, alert, oriented to place, person, time, situation. Sustains attention. Recent/remote memory intact, Language is full to spontaneous speech/naming/comprehension. Fund of Knowledge is full  CN: Optic discs are flat with normal vasculature, PERRL, Extraoccular movements and visual fields are full.  Normal facial sensation and strength, Hearing symmetric, Tongue and Palate are midline and strong. Shoulder Shrug symmetric and strong.  Motor: Normal bulk, tone, no abnormal movements. 5/5 strength bilateral upper/lower extremities with 2+ reflexes and no clonus  Sensory: symmetric to pain, temp, and vibration/proprioception. Romberg negative  Cerebellar: Finger-nose,Heal-shin, Rapid alternating movements intact  Gait: Normal stance, no ataxia    10/2018 CTA brain: No acute abnormality. No high-grade stenosis or major vessel occlusion.    Assessment/Plan: Mandi Juan is a 48 y.o. female with over 8 year history of migraine with aura, episodic, not intractable.     I recommend:   1. CTA head unremarkable 2018   2. Otherwise, spells are consistent with migraine and for several days of headaches prior, medrol mio helps.   4. She will continue verapamil for heart flutter which may be acting like a good prevention medication.   -offered a second prevention medication but she declines today.  5. Diclofenac causes some tongue tingling and discoloration-but she has seen ENT for this which continued after use. She saw ENT about this and was treated for yeast.   -Try Fioricet as previously prescribed in low quantity and compazine for rescue  6. Previously, she refused to take elavil due to a poor reaction (anxiety) with Zoloft and fear of side effects.  She has tried maxalt and relpax and imitrex with jaw tightness and sadness side effects. She has failed Toradol   7. She states she has had a few episodes of tingling and running sensation down her right leg without pain or sciatica symptoms- can try PT if this worsening and more imaging if not improve or worse over time-she declines at this time. She has a known meningocele at the sacral level followed by Dr Wynne at  prior.  RTC 6 months

## 2019-04-29 ENCOUNTER — OFFICE VISIT (OUTPATIENT)
Dept: FAMILY MEDICINE | Facility: CLINIC | Age: 49
End: 2019-04-29
Payer: COMMERCIAL

## 2019-04-29 VITALS
HEIGHT: 66 IN | WEIGHT: 129.81 LBS | BODY MASS INDEX: 20.86 KG/M2 | OXYGEN SATURATION: 98 % | HEART RATE: 83 BPM | TEMPERATURE: 98 F | DIASTOLIC BLOOD PRESSURE: 70 MMHG | SYSTOLIC BLOOD PRESSURE: 110 MMHG

## 2019-04-29 DIAGNOSIS — R30.0 DYSURIA: Primary | ICD-10-CM

## 2019-04-29 DIAGNOSIS — N30.01 ACUTE CYSTITIS WITH HEMATURIA: ICD-10-CM

## 2019-04-29 LAB
BILIRUB SERPL-MCNC: ABNORMAL MG/DL
BLOOD, POC UA: ABNORMAL
GLUCOSE UR QL STRIP: NORMAL
KETONES UR QL STRIP: NEGATIVE
LEUKOCYTE ESTERASE URINE, POC: ABNORMAL
NITRITE, POC UA: POSITIVE
PH, POC UA: 6
PROTEIN, POC: 30
SPECIFIC GRAVITY, POC UA: 1.02
UROBILINOGEN, POC UA: NORMAL

## 2019-04-29 PROCEDURE — 87186 SC STD MICRODIL/AGAR DIL: CPT

## 2019-04-29 PROCEDURE — 3008F PR BODY MASS INDEX (BMI) DOCUMENTED: ICD-10-PCS | Mod: CPTII,S$GLB,, | Performed by: INTERNAL MEDICINE

## 2019-04-29 PROCEDURE — 81000 URINALYSIS NONAUTO W/SCOPE: CPT | Mod: S$GLB,,, | Performed by: INTERNAL MEDICINE

## 2019-04-29 PROCEDURE — 87088 URINE BACTERIA CULTURE: CPT

## 2019-04-29 PROCEDURE — 99999 PR PBB SHADOW E&M-EST. PATIENT-LVL III: CPT | Mod: PBBFAC,,, | Performed by: INTERNAL MEDICINE

## 2019-04-29 PROCEDURE — 99213 PR OFFICE/OUTPT VISIT, EST, LEVL III, 20-29 MIN: ICD-10-PCS | Mod: 25,S$GLB,, | Performed by: INTERNAL MEDICINE

## 2019-04-29 PROCEDURE — 87077 CULTURE AEROBIC IDENTIFY: CPT

## 2019-04-29 PROCEDURE — 81000 POCT URINALYSIS: ICD-10-PCS | Mod: S$GLB,,, | Performed by: INTERNAL MEDICINE

## 2019-04-29 PROCEDURE — 87086 URINE CULTURE/COLONY COUNT: CPT

## 2019-04-29 PROCEDURE — 3008F BODY MASS INDEX DOCD: CPT | Mod: CPTII,S$GLB,, | Performed by: INTERNAL MEDICINE

## 2019-04-29 PROCEDURE — 99999 PR PBB SHADOW E&M-EST. PATIENT-LVL III: ICD-10-PCS | Mod: PBBFAC,,, | Performed by: INTERNAL MEDICINE

## 2019-04-29 PROCEDURE — 99213 OFFICE O/P EST LOW 20 MIN: CPT | Mod: 25,S$GLB,, | Performed by: INTERNAL MEDICINE

## 2019-04-29 RX ORDER — SULFAMETHOXAZOLE AND TRIMETHOPRIM 800; 160 MG/1; MG/1
1 TABLET ORAL 2 TIMES DAILY
Qty: 20 TABLET | Refills: 0 | Status: SHIPPED | OUTPATIENT
Start: 2019-04-29 | End: 2019-10-22

## 2019-04-29 NOTE — PATIENT INSTRUCTIONS
We have reviewed your prescription medications. You do have a urine infection today. We will treat with bactrim since it worked last time. We will also send a urine culture. Let us know if you are not getting better in about a week. Continue to drink plenty of fluids.

## 2019-04-30 NOTE — PROGRESS NOTES
Subjective:       Patient ID: Mandi Juan is a 48 y.o. female.    Chief Complaint: Urinary Tract Infection     I have reviewed the PM,  and  for this patient. She  has a past medical history of Anxiety, GERD (gastroesophageal reflux disease), Irritable bowel syndrome, Migraines, Urticaria, and UTI (urinary tract infection). She presents to for probable UTI. She started having frequent urination and dysuria last night. She also had streaks of pink on the toilet paper. She had a UTI in 2016. A few years earlier, she had recurrent uti's and she took a preventive medicine. She uses good sexual hygiene -- urinating before and after sex. No fever.     Urinary Tract Infection    This is a new problem. The current episode started today. The problem occurs every urination. The problem has been gradually improving. The quality of the pain is described as burning. The pain is mild. There has been no fever. She is sexually active. There is no history of pyelonephritis. Associated symptoms include frequency and urgency. Pertinent negatives include no chills, flank pain, hematuria, nausea, vomiting, constipation or rash. She has tried increased fluids for the symptoms. The treatment provided mild relief.     Active Ambulatory Problems     Diagnosis Date Noted    GERD (gastroesophageal reflux disease)     Migraines     Irritable bowel syndrome     Anxiety     Dysuria 11/16/2016    Subacute vaginitis 03/03/2018     Resolved Ambulatory Problems     Diagnosis Date Noted    No Resolved Ambulatory Problems     Past Medical History:   Diagnosis Date    Anxiety     GERD (gastroesophageal reflux disease)     Irritable bowel syndrome     Migraines     Urticaria     UTI (urinary tract infection)          MEDICATIONS:  Current Outpatient Medications:     BALZIVA, 28, 0.4-35 mg-mcg per tablet, Take 1 tablet by mouth once daily., Disp: , Rfl:     famotidine (PEPCID) 40 MG tablet, Take 40 mg by mouth nightly as needed.  , Disp: , Rfl:     fluticasone (FLONASE) 50 mcg/actuation nasal spray, 1 spray by Each Nare route once daily., Disp: , Rfl:     ibuprofen (ADVIL,MOTRIN) 200 MG tablet, Take 200 mg by mouth every 6 (six) hours as needed for Pain., Disp: , Rfl:     pantoprazole (PROTONIX) 40 MG tablet, Take 40 mg by mouth once daily. , Disp: , Rfl:     prochlorperazine (COMPAZINE) 10 MG tablet, Take 1 tablet (10 mg total) by mouth 2 (two) times daily as needed (nausea with headache)., Disp: 20 tablet, Rfl: 5    verapamil (CALAN-SR) 180 MG CR tablet, TAKE 1/2 TABLET ONCE A DAY WITH FOOD (Patient taking differently: 1 tablet daily), Disp: 15 tablet, Rfl: 3    diclofenac potassium (CAMBIA) 50 mg PwPk, Take 1 Package by mouth daily as needed., Disp: 9 each, Rfl: 11    sulfamethoxazole-trimethoprim 800-160mg (BACTRIM DS) 800-160 mg Tab, Take 1 tablet by mouth 2 (two) times daily., Disp: 20 tablet, Rfl: 0      HEALTH MAINTENANCE:   Health Maintenance   Topic Date Due    TETANUS VACCINE  07/05/1987    Pap Smear with HPV Cotest  06/02/2018    Lipid Panel  03/11/2020    Mammogram  09/13/2020    Influenza Vaccine  Completed       Review of Systems   Constitutional: Negative for chills, fatigue and fever.   HENT: Negative for congestion, ear discharge, ear pain, rhinorrhea and sore throat.    Eyes: Negative for discharge, redness and itching.   Respiratory: Negative for cough, chest tightness, shortness of breath and wheezing.    Cardiovascular: Negative for chest pain, palpitations and leg swelling.   Gastrointestinal: Negative for abdominal pain, constipation, diarrhea, nausea and vomiting.   Endocrine: Negative for cold intolerance and heat intolerance.   Genitourinary: Positive for frequency and urgency. Negative for dysuria, flank pain and hematuria.   Musculoskeletal: Negative for arthralgias, back pain, joint swelling and myalgias.   Skin: Negative for color change and rash.   Neurological: Negative for dizziness, tremors,  numbness and headaches.   Psychiatric/Behavioral: Negative for dysphoric mood and sleep disturbance. The patient is not nervous/anxious.        Objective:          LABS    CMP  Creatinine   Date Value Ref Range Status   10/12/2018 1.0 0.5 - 1.4 mg/dL Final     eGFR if    Date Value Ref Range Status   10/12/2018 >60 >60 mL/min/1.73 m^2 Final     eGFR if non    Date Value Ref Range Status   10/12/2018 >60 >60 mL/min/1.73 m^2 Final     Comment:     Calculation used to obtain the estimated glomerular filtration  rate (eGFR) is the CKD-EPI equation.          Lab Results   Component Value Date    WBC 5.60 04/12/2018    HGB 13.5 04/12/2018    HCT 40.6 04/12/2018    MCV 96 04/12/2018     04/12/2018       No results for input(s): TSH, HDL, CHOL, TRIG, LDLCALC, CHOLHDL, NONHDLCHOL, TOTALCHOLEST in the last 2160 hours.      A1C:  No results for input(s): HGBA1C in the last 2160 hours.      Physical Exam   Constitutional: She appears well-developed and well-nourished. She does not have a sickly appearance.   HENT:   Head: Normocephalic and atraumatic.   Right Ear: External ear normal. Tympanic membrane is not erythematous. No middle ear effusion.   Left Ear: External ear normal. Tympanic membrane is not erythematous.  No middle ear effusion.   Nose: No rhinorrhea. Right sinus exhibits no maxillary sinus tenderness and no frontal sinus tenderness. Left sinus exhibits no maxillary sinus tenderness and no frontal sinus tenderness.   Mouth/Throat: No posterior oropharyngeal edema or posterior oropharyngeal erythema. No tonsillar exudate.   Eyes: Pupils are equal, round, and reactive to light. Conjunctivae and EOM are normal. Right eye exhibits no discharge. Left eye exhibits no discharge. Right conjunctiva is not injected. Left conjunctiva is not injected.   Neck: No thyromegaly present.   Cardiovascular: Normal rate, regular rhythm, normal heart sounds and intact distal pulses.   No murmur  heard.  Pulmonary/Chest: Effort normal and breath sounds normal. She has no wheezes. She has no rhonchi. She has no rales.   Abdominal: Bowel sounds are normal. She exhibits no distension. There is no tenderness.   Musculoskeletal: She exhibits no edema or tenderness.   Lymphadenopathy:     She has no cervical adenopathy.   Neurological: She displays normal reflexes. No cranial nerve deficit.   Skin: Skin is warm and dry. No lesion and no rash noted.   Psychiatric: She has a normal mood and affect. Her behavior is normal. Her mood appears not anxious. Her speech is not rapid and/or pressured. She is not agitated and not aggressive. She does not exhibit a depressed mood.             Assessment and Plan:     Problem List Items Addressed This Visit        Renal/    Dysuria - Primary - ua shows infection.     Relevant Orders    POCT Urinalysis (Completed)      Other Visit Diagnoses     Acute cystitis with hematuria    - send urine culture. Treat with bactrim because it worked for her last time.     Relevant Orders    Urine culture          Orders Placed This Encounter    Urine culture    POCT Urinalysis    sulfamethoxazole-trimethoprim 800-160mg (BACTRIM DS) 800-160 mg Tab         Follow-up with Dr Myers in as needed.

## 2019-05-02 LAB — BACTERIA UR CULT: NORMAL

## 2019-05-03 ENCOUNTER — PATIENT MESSAGE (OUTPATIENT)
Dept: FAMILY MEDICINE | Facility: CLINIC | Age: 49
End: 2019-05-03

## 2019-06-04 ENCOUNTER — PATIENT MESSAGE (OUTPATIENT)
Dept: NEUROLOGY | Facility: CLINIC | Age: 49
End: 2019-06-04

## 2019-06-06 ENCOUNTER — PATIENT MESSAGE (OUTPATIENT)
Dept: NEUROLOGY | Facility: CLINIC | Age: 49
End: 2019-06-06

## 2019-06-06 DIAGNOSIS — M54.30 SCIATICA, UNSPECIFIED LATERALITY: Primary | ICD-10-CM

## 2019-06-07 ENCOUNTER — HOSPITAL ENCOUNTER (OUTPATIENT)
Dept: RADIOLOGY | Facility: HOSPITAL | Age: 49
Discharge: HOME | End: 2019-06-07
Attending: PSYCHIATRY & NEUROLOGY
Payer: COMMERCIAL

## 2019-06-07 DIAGNOSIS — M54.30 SCIATICA, UNSPECIFIED LATERALITY: ICD-10-CM

## 2019-06-07 PROCEDURE — 72100 XR LUMBAR SPINE AP AND LATERAL: ICD-10-PCS | Mod: 26,,, | Performed by: RADIOLOGY

## 2019-06-07 PROCEDURE — 72100 X-RAY EXAM L-S SPINE 2/3 VWS: CPT | Mod: TC

## 2019-06-07 PROCEDURE — 72100 X-RAY EXAM L-S SPINE 2/3 VWS: CPT | Mod: 26,,, | Performed by: RADIOLOGY

## 2019-06-09 ENCOUNTER — PATIENT MESSAGE (OUTPATIENT)
Dept: NEUROLOGY | Facility: CLINIC | Age: 49
End: 2019-06-09

## 2019-06-11 PROBLEM — G57.02 PIRIFORMIS SYNDROME OF LEFT SIDE: Status: ACTIVE | Noted: 2019-06-11

## 2019-06-11 PROBLEM — M54.10 RADICULAR PAIN: Status: ACTIVE | Noted: 2019-06-11

## 2019-07-12 DIAGNOSIS — Z12.11 COLON CANCER SCREENING: ICD-10-CM

## 2019-08-08 DIAGNOSIS — Z12.39 SCREENING BREAST EXAMINATION: Primary | ICD-10-CM

## 2019-08-08 LAB — HUMAN PAPILLOMAVIRUS (HPV): NORMAL

## 2019-10-19 ENCOUNTER — PATIENT OUTREACH (OUTPATIENT)
Dept: ADMINISTRATIVE | Facility: OTHER | Age: 49
End: 2019-10-19

## 2019-10-22 ENCOUNTER — OFFICE VISIT (OUTPATIENT)
Dept: NEUROLOGY | Facility: CLINIC | Age: 49
End: 2019-10-22
Payer: COMMERCIAL

## 2019-10-22 VITALS
RESPIRATION RATE: 14 BRPM | WEIGHT: 125.25 LBS | HEIGHT: 66 IN | BODY MASS INDEX: 20.13 KG/M2 | SYSTOLIC BLOOD PRESSURE: 118 MMHG | DIASTOLIC BLOOD PRESSURE: 70 MMHG | HEART RATE: 65 BPM

## 2019-10-22 DIAGNOSIS — G44.89 CHRONIC MIXED HEADACHE SYNDROME: Primary | ICD-10-CM

## 2019-10-22 DIAGNOSIS — M79.18 PIRIFORMIS MUSCLE PAIN: ICD-10-CM

## 2019-10-22 PROCEDURE — 3008F BODY MASS INDEX DOCD: CPT | Mod: CPTII,S$GLB,, | Performed by: PSYCHIATRY & NEUROLOGY

## 2019-10-22 PROCEDURE — 99213 OFFICE O/P EST LOW 20 MIN: CPT | Mod: S$GLB,,, | Performed by: PSYCHIATRY & NEUROLOGY

## 2019-10-22 PROCEDURE — 99999 PR PBB SHADOW E&M-EST. PATIENT-LVL III: ICD-10-PCS | Mod: PBBFAC,,, | Performed by: PSYCHIATRY & NEUROLOGY

## 2019-10-22 PROCEDURE — 99999 PR PBB SHADOW E&M-EST. PATIENT-LVL III: CPT | Mod: PBBFAC,,, | Performed by: PSYCHIATRY & NEUROLOGY

## 2019-10-22 PROCEDURE — 3008F PR BODY MASS INDEX (BMI) DOCUMENTED: ICD-10-PCS | Mod: CPTII,S$GLB,, | Performed by: PSYCHIATRY & NEUROLOGY

## 2019-10-22 PROCEDURE — 99213 PR OFFICE/OUTPT VISIT, EST, LEVL III, 20-29 MIN: ICD-10-PCS | Mod: S$GLB,,, | Performed by: PSYCHIATRY & NEUROLOGY

## 2019-10-22 RX ORDER — NITROFURANTOIN 25; 75 MG/1; MG/1
CAPSULE ORAL
COMMUNITY
Start: 2019-09-13 | End: 2019-10-22

## 2019-10-22 RX ORDER — POLYETHYLENE GLYCOL 3350 17 G/17G
POWDER, FOR SOLUTION ORAL NIGHTLY
COMMUNITY
End: 2022-09-21

## 2019-10-22 RX ORDER — CIPROFLOXACIN 500 MG/1
TABLET ORAL
COMMUNITY
Start: 2019-09-27 | End: 2019-10-22

## 2019-10-22 NOTE — PROGRESS NOTES
HPI: Mandi Juan is a 48 y.o. female with over 8 year history of migraine with aura, episodic, not intractable.       Since the last visit, the patient saw pain management about her radicular symptoms    She eventually had relief with dry needling of her piriformis muscle and she never needed to see pain management.     Symptoms are resolved.     Her headaches are greatly improved. She is exercising. She manages headaches with OTC meds.  Never needed fioricet.     She is in a GI work up for diarrhea (had colonscopy)      Review of Systems   Constitutional: Negative for fever.   HENT: Negative for nosebleeds.    Eyes: Negative for double vision.   Respiratory: Negative for hemoptysis.    Cardiovascular: Negative for leg swelling.   Gastrointestinal: Negative for blood in stool.   Genitourinary: Negative for hematuria.   Skin: Negative for rash.   Neurological: Positive for headaches.   Endo/Heme/Allergies: Does not bruise/bleed easily.   Psychiatric/Behavioral: Negative for memory loss.         I have reviewed all of this patient's past medical and surgical histories as well as family and social histories and active allergies and medications as documented in the electronic medical record.    Exam:  Gen Appearance, well developed/nourished in no apparent distress  CV: 2+ distal pulses with no edema or swelling  Neuro:  MS: Awake, alert, oriented to place, person, time, situation. Sustains attention. Recent/remote memory intact, Language is full to spontaneous speech/naming/comprehension. Fund of Knowledge is full  CN: Optic discs are flat with normal vasculature, PERRL, Extraoccular movements and visual fields are full. Normal facial sensation and strength, Hearing symmetric, Tongue and Palate are midline and strong. Shoulder Shrug symmetric and strong.  Motor: Normal bulk, tone, no abnormal movements. 5/5 strength bilateral upper/lower extremities with 2+ reflexes and no clonus  Sensory: symmetric to pain, temp,  and vibration/proprioception. Romberg negative  Cerebellar: Finger-nose,Heal-shin, Rapid alternating movements intact  Gait: Normal stance, no ataxia    10/2018 CTA brain: No acute abnormality. No high-grade stenosis or major vessel occlusion.    Imagin2019 Xray L spine: There is no evidence of an acute fracture.  No focal disk space narrowing.  Align no evidence of spondylolysis or spondylolisthesis.    Assessment/Plan: Mandi Juan is a 49 y.o. female with over 8 year history of migraine with aura, episodic, not intractable.     I recommend:   1. CTA head unremarkable    2. Otherwise, spells are consistent with migraine and for several days of headaches prior, medrol mio helped.   4. She will continue verapamil for heart flutter which may be acting like a good prevention medication.   -does not currently need further prevention medications  5. Diclofenac causes some tongue tingling and discoloration-but she saw ENT about this and was treated for yeast.   -Try Fioricet as previously prescribed in low quantity and compazine for rescue if needed (she has not required this)  6. Previously, she declined elavil due to anxiety side with Zoloft and fear of side effects.  She has tried maxalt and relpax and imitrex with jaw tightness and sadness side effects. She has failed Toradol   7. She  had tingling and running sensation down her right leg with radicular pain in 2019. Xray L spine unremarkable in 2019  -She has a known meningocele at the sacral level followed by Dr Wynne at  prior.  -She eventually had relief with dry needling of her piriformis muscle and symptoms are resolved  RTC 1 year, but notify me sooner if headaches increase

## 2019-11-14 ENCOUNTER — OFFICE VISIT (OUTPATIENT)
Dept: INTERNAL MEDICINE | Facility: CLINIC | Age: 49
End: 2019-11-14
Payer: COMMERCIAL

## 2019-11-14 VITALS
OXYGEN SATURATION: 99 % | TEMPERATURE: 98 F | HEART RATE: 82 BPM | SYSTOLIC BLOOD PRESSURE: 108 MMHG | WEIGHT: 125.31 LBS | RESPIRATION RATE: 18 BRPM | DIASTOLIC BLOOD PRESSURE: 62 MMHG | BODY MASS INDEX: 20.14 KG/M2 | HEIGHT: 66 IN

## 2019-11-14 DIAGNOSIS — F40.243 FEAR OF FLYING: Primary | ICD-10-CM

## 2019-11-14 DIAGNOSIS — Q43.8 REDUNDANT COLON: ICD-10-CM

## 2019-11-14 DIAGNOSIS — K66.0 INTESTINAL ADHESIONS: ICD-10-CM

## 2019-11-14 PROCEDURE — 3008F PR BODY MASS INDEX (BMI) DOCUMENTED: ICD-10-PCS | Mod: CPTII,S$GLB,, | Performed by: INTERNAL MEDICINE

## 2019-11-14 PROCEDURE — 99999 PR PBB SHADOW E&M-EST. PATIENT-LVL III: ICD-10-PCS | Mod: PBBFAC,,, | Performed by: INTERNAL MEDICINE

## 2019-11-14 PROCEDURE — 99213 PR OFFICE/OUTPT VISIT, EST, LEVL III, 20-29 MIN: ICD-10-PCS | Mod: S$GLB,,, | Performed by: INTERNAL MEDICINE

## 2019-11-14 PROCEDURE — 3008F BODY MASS INDEX DOCD: CPT | Mod: CPTII,S$GLB,, | Performed by: INTERNAL MEDICINE

## 2019-11-14 PROCEDURE — 99999 PR PBB SHADOW E&M-EST. PATIENT-LVL III: CPT | Mod: PBBFAC,,, | Performed by: INTERNAL MEDICINE

## 2019-11-14 PROCEDURE — 99213 OFFICE O/P EST LOW 20 MIN: CPT | Mod: S$GLB,,, | Performed by: INTERNAL MEDICINE

## 2019-11-14 RX ORDER — ALPRAZOLAM 0.5 MG/1
0.5 TABLET ORAL DAILY
Qty: 10 TABLET | Refills: 0 | Status: SHIPPED | OUTPATIENT
Start: 2019-11-14 | End: 2020-11-09

## 2019-11-14 NOTE — PROGRESS NOTES
INTERNAL MEDICINE    Patient Active Problem List   Diagnosis    GERD (gastroesophageal reflux disease)    Migraines    Irritable bowel syndrome    Anxiety    Dysuria    Subacute vaginitis    Radicular pain    Piriformis syndrome of left side       CC:   Chief Complaint   Patient presents with    Follow-up       SUBJECTIVE:  Mandi Juan   is a 49 y.o. female  HPI   49y/oWF here for 2 reasons :  - fear of flying, which she will be doing in 1 month. Has only flown once in her life, but she has a significant phobia of being in closed spaces. Avoids all situations if she can.  -recently had a high colonic impaction, requiring an extensive bowel regimen, and ultimately significant stooling process. She is being followed by Dr. Warren and Evelina, who recently did a colonoscopy. A significantly redundant colon was found, with many adhesions creating kinks.  Additionally, she had a Csection years ago which is thought to have caused adhesions.  -there is a question of a meningocele that may be contributing to her low back discomfort. However, years ago was told by a NS, to not let anyone operate on it. No bladder issues.    ROS: Review of Systems   Constitutional: Negative.    HENT: Negative.    Eyes: Negative.    Respiratory: Negative.    Cardiovascular: Negative.    Gastrointestinal: Positive for abdominal distention, abdominal pain and constipation.   Endocrine: Negative.    Genitourinary: Negative.    Musculoskeletal: Negative.    Skin: Negative.    Neurological: Negative.    Hematological: Negative.    Psychiatric/Behavioral: The patient is nervous/anxious.        Past Medical History:   Diagnosis Date    Anxiety     GERD (gastroesophageal reflux disease)     Irritable bowel syndrome     Migraines     Urticaria     UTI (urinary tract infection)        Past Surgical History:   Procedure Laterality Date     SECTION      COLONOSCOPY      TONSILLECTOMY         Family History   Problem Relation  Age of Onset    No Known Problems Mother     Arthritis Father     No Known Problems Brother     Heart disease Daughter     No Known Problems Son     No Known Problems Brother     No Known Problems Daughter     Breast cancer Paternal Aunt     Ovarian cancer Paternal Aunt        Social History     Tobacco Use    Smoking status: Never Smoker    Smokeless tobacco: Never Used   Substance Use Topics    Alcohol use: Yes     Alcohol/week: 0.8 standard drinks     Types: 1 Standard drinks or equivalent per week    Drug use: No       Social History     Social History Narrative    Not on file       ALLERGIES:   Review of patient's allergies indicates:   Allergen Reactions    Biaxin [clarithromycin]     Imitrex [sumatriptan succinate] Palpitations    Maxalt [rizatriptan] Palpitations       MEDS:   Current Outpatient Medications:     famotidine (PEPCID) 40 MG tablet, Take 40 mg by mouth nightly as needed. , Disp: , Rfl:     pantoprazole (PROTONIX) 40 MG tablet, Take 40 mg by mouth once daily. , Disp: , Rfl:     plecanatide (TRULANCE) 3 mg Tab, Take 3 mg by mouth once daily., Disp: , Rfl:     polyethylene glycol (GLYCOLAX) 17 gram PwPk, Take by mouth every evening., Disp: , Rfl:     prochlorperazine (COMPAZINE) 10 MG tablet, Take 1 tablet (10 mg total) by mouth 2 (two) times daily as needed (nausea with headache)., Disp: 20 tablet, Rfl: 5    verapamil (CALAN-SR) 180 MG CR tablet, TAKE 1/2 TABLET ONCE A DAY WITH FOOD (Patient taking differently: 1 tablet daily), Disp: 15 tablet, Rfl: 3    ALPRAZolam (XANAX) 0.5 MG tablet, Take 1 tablet (0.5 mg total) by mouth once daily., Disp: 10 tablet, Rfl: 0    diclofenac potassium (CAMBIA) 50 mg PwPk, Take 1 Package by mouth daily as needed., Disp: 9 each, Rfl: 11    fluticasone (FLONASE) 50 mcg/actuation nasal spray, 1 spray by Each Nare route once daily., Disp: , Rfl:     OBJECTIVE:   Vitals:    11/14/19 0853   BP: 108/62   BP Location: Left arm   Patient Position:  "Sitting   BP Method: Medium (Manual)   Pulse: 82   Resp: 18   Temp: 98.2 °F (36.8 °C)   TempSrc: Oral   SpO2: 99%   Weight: 56.8 kg (125 lb 4.8 oz)   Height: 5' 6" (1.676 m)     Body mass index is 20.22 kg/m².    Physical Exam   Constitutional: She is oriented to person, place, and time. She appears well-developed and well-nourished.   HENT:   Head: Normocephalic and atraumatic.   Right Ear: External ear normal.   Left Ear: External ear normal.   Nose: Nose normal.   Mouth/Throat: Oropharynx is clear and moist.   Eyes: Pupils are equal, round, and reactive to light. Conjunctivae and EOM are normal.   Neck: Normal range of motion. Neck supple.   Cardiovascular: Normal rate, regular rhythm and normal heart sounds.   Pulmonary/Chest: Effort normal and breath sounds normal.   Abdominal: Soft. Bowel sounds are normal.   Musculoskeletal: Normal range of motion.   Neurological: She is alert and oriented to person, place, and time.   Skin: Skin is warm and dry.   Psychiatric: She has a normal mood and affect. Her behavior is normal. Judgment and thought content normal.   Nursing note and vitals reviewed.        PERTINENT RESULTS:   CBC:  Recent Labs   Lab Result Units 10/22/19  0719   WBC K/uL 3.49*   RBC M/uL 4.56   Hemoglobin g/dL 14.2   Hematocrit % 43.6   Platelets K/uL 242   Mean Corpuscular Volume fL 96   Mean Corpuscular Hemoglobin pg 31.1*   Mean Corpuscular Hemoglobin Conc g/dL 32.6     CMP:  Recent Labs   Lab Result Units 10/22/19  0719   Glucose mg/dL 100   Calcium mg/dL 9.5   Albumin g/dL 4.4   Total Protein g/dL 7.4   Sodium mmol/L 139   Potassium mmol/L 3.9   CO2 mmol/L 26   Chloride mmol/L 104   BUN, Bld mg/dL 15   Alkaline Phosphatase U/L 101   ALT U/L 33   AST U/L 33   Total Bilirubin mg/dL 0.8     URINALYSIS:  No results for input(s): COLORU, CLARITYU, SPECGRAV, PHUR, PROTEINUA, GLUCOSEU, BILIRUBINCON, BLOODU, WBCU, RBCU, BACTERIA, MUCUS, NITRITE, LEUKOCYTESUR, UROBILINOGEN, HYALINECASTS in the last 2160 " hours.   LIPIDS:  No results for input(s): TSH, HDL, CHOL, TRIG, LDLCALC, CHOLHDL, NONHDLCHOL, TOTALCHOLEST in the last 2160 hours.          ASSESSMENT:  Problem List Items Addressed This Visit     None      Visit Diagnoses     Fear of flying    -  Primary    Relevant Medications    ALPRAZolam (XANAX) 0.5 MG tablet    Redundant colon        Intestinal adhesions          Agoraphobia?    PLAN:   Orders Placed This Encounter    ALPRAZolam (XANAX) 0.5 MG tablet     No orders of the defined types were placed in this encounter.  Try the xanax several weeks before needing to travel to  what the effects are, and to see if she has any lingering symptoms.    Follow-up with me in 1month.   Dr. Rc Myers  Internal Medicine

## 2020-02-10 ENCOUNTER — PATIENT OUTREACH (OUTPATIENT)
Dept: ADMINISTRATIVE | Facility: HOSPITAL | Age: 50
End: 2020-02-10

## 2020-07-24 ENCOUNTER — OFFICE VISIT (OUTPATIENT)
Dept: FAMILY MEDICINE | Facility: CLINIC | Age: 50
End: 2020-07-24
Payer: COMMERCIAL

## 2020-07-24 VITALS
DIASTOLIC BLOOD PRESSURE: 70 MMHG | OXYGEN SATURATION: 99 % | HEART RATE: 79 BPM | BODY MASS INDEX: 19.72 KG/M2 | SYSTOLIC BLOOD PRESSURE: 124 MMHG | WEIGHT: 122.69 LBS | TEMPERATURE: 98 F | HEIGHT: 66 IN

## 2020-07-24 DIAGNOSIS — K58.1 IRRITABLE BOWEL SYNDROME WITH CONSTIPATION: ICD-10-CM

## 2020-07-24 DIAGNOSIS — R10.2 PELVIC PAIN: ICD-10-CM

## 2020-07-24 DIAGNOSIS — R63.4 WEIGHT LOSS: Primary | ICD-10-CM

## 2020-07-24 DIAGNOSIS — M12.9 ARTHROPATHY: ICD-10-CM

## 2020-07-24 DIAGNOSIS — Q05.9 MENINGOCELE SPINAL: ICD-10-CM

## 2020-07-24 DIAGNOSIS — R00.2 PALPITATIONS: ICD-10-CM

## 2020-07-24 PROCEDURE — 3008F PR BODY MASS INDEX (BMI) DOCUMENTED: ICD-10-PCS | Mod: CPTII,S$GLB,, | Performed by: INTERNAL MEDICINE

## 2020-07-24 PROCEDURE — 99214 OFFICE O/P EST MOD 30 MIN: CPT | Mod: S$GLB,,, | Performed by: INTERNAL MEDICINE

## 2020-07-24 PROCEDURE — 99999 PR PBB SHADOW E&M-EST. PATIENT-LVL V: ICD-10-PCS | Mod: PBBFAC,,, | Performed by: INTERNAL MEDICINE

## 2020-07-24 PROCEDURE — 3008F BODY MASS INDEX DOCD: CPT | Mod: CPTII,S$GLB,, | Performed by: INTERNAL MEDICINE

## 2020-07-24 PROCEDURE — 99999 PR PBB SHADOW E&M-EST. PATIENT-LVL V: CPT | Mod: PBBFAC,,, | Performed by: INTERNAL MEDICINE

## 2020-07-24 PROCEDURE — 99214 PR OFFICE/OUTPT VISIT, EST, LEVL IV, 30-39 MIN: ICD-10-PCS | Mod: S$GLB,,, | Performed by: INTERNAL MEDICINE

## 2020-07-24 NOTE — PATIENT INSTRUCTIONS
We have reviewed your prescription medications.   We will refer to nutrition to see if they can help with weight loss due to constipation treatment. I would also recommend doing the Mamie Wel food sensitivity kit, which is available online.  We will check labs to see if you have signs of a rheumatologic illness.   Continue to follow-up with GYN and GI as recommended.   I recommend follow-up in 3 months.

## 2020-08-02 NOTE — PROGRESS NOTES
Subjective:       Patient ID: Mandi Juan is a 50 y.o. female.    Chief Complaint: Hand Pain, GI Problem, Weight Loss, and Establish Care     I have reviewed the PMH,  and  for this patient    She  has a past medical history of Anxiety, GERD (gastroesophageal reflux disease), Irritable bowel syndrome, Migraines, Palpitations, and UTI (urinary tract infection).     The patient presents today for follow-up of chronic conditions.  The patient complains of pain in her hand and GI issues.  She is here today to establish care.  She is a new patient to me.  She has been seeing gastroenterology about chronic constipation.  She is now on daily laxatives is which cause her to have trouble gaining weight.  She would like to talk to a nutritionist about how to maintain her weight.  She has chronic abdominal pain due to IBS with constipation.  She also has a redundant colon.  She has adhesions in her abdomen from a .  She does form thick scars.  Her gynecologist does not recommend taking down the adhesions because it might make the scar tissue worse.  She currently takes MiraLax and true Jesus Alberto for her irritable bowel syndrome.  She currently weighs 122 lb.  She reports that she normally weighs around 135-140.  Her last blood work was good.  She sees Dr. Haque for palpitations.  He prescribes verapamil which is a medication which can cause constipation.  She complains of having arthritis in her hands.  Her fingers are sore and swollen.  She has pain in her hands.  She reports that her father has rheumatoid arthritis.  She would like blood test to evaluate her for possible rheumatoid arthritis.  Her blood pressure looks good at 124/70.    The patient denies chest pain, shortness of breath, nausea, or dizziness.     Active Ambulatory Problems     Diagnosis Date Noted    GERD (gastroesophageal reflux disease)     Migraines     Irritable bowel syndrome with constipation     Anxiety     Dysuria 2016     Subacute vaginitis 03/03/2018    Radicular pain 06/11/2019    Piriformis syndrome of left side 06/11/2019    Meningocele spinal 07/24/2020    Palpitations 07/24/2020     Resolved Ambulatory Problems     Diagnosis Date Noted    No Resolved Ambulatory Problems     Past Medical History:   Diagnosis Date    Irritable bowel syndrome     UTI (urinary tract infection)          MEDICATIONS:  Current Outpatient Medications:     famotidine (PEPCID) 40 MG tablet, Take 40 mg by mouth nightly as needed. , Disp: , Rfl:     fluticasone (FLONASE) 50 mcg/actuation nasal spray, 1 spray by Each Nare route once daily., Disp: , Rfl:     pantoprazole (PROTONIX) 40 MG tablet, Take 40 mg by mouth once daily. , Disp: , Rfl:     plecanatide (TRULANCE) 3 mg Tab, Take 3 mg by mouth once daily., Disp: , Rfl:     prochlorperazine (COMPAZINE) 10 MG tablet, Take 1 tablet (10 mg total) by mouth 2 (two) times daily as needed (nausea with headache)., Disp: 20 tablet, Rfl: 5    verapamil (CALAN-SR) 180 MG CR tablet, TAKE 1/2 TABLET ONCE A DAY WITH FOOD (Patient taking differently: 1 tablet daily), Disp: 15 tablet, Rfl: 3    ALPRAZolam (XANAX) 0.5 MG tablet, Take 1 tablet (0.5 mg total) by mouth once daily., Disp: 10 tablet, Rfl: 0    diclofenac potassium (CAMBIA) 50 mg PwPk, Take 1 Package by mouth daily as needed., Disp: 9 each, Rfl: 11    polyethylene glycol (GLYCOLAX) 17 gram PwPk, Take by mouth every evening., Disp: , Rfl:       HEALTH MAINTENANCE:   Health Maintenance   Topic Date Due    Hepatitis C Screening  1970    TETANUS VACCINE  07/05/1987    Lipid Panel  03/11/2020    Mammogram  09/16/2021    Pap Smear with HPV Cotest  08/08/2024       Review of Systems   Constitutional: Positive for unexpected weight change. Negative for activity change, chills, fatigue and fever.   HENT: Negative for congestion, ear discharge, ear pain, hearing loss, rhinorrhea, sore throat and trouble swallowing.    Eyes: Negative for  discharge, redness, itching and visual disturbance.   Respiratory: Negative for cough, chest tightness, shortness of breath and wheezing.    Cardiovascular: Negative for chest pain, palpitations and leg swelling.   Gastrointestinal: Positive for constipation and diarrhea. Negative for abdominal pain, blood in stool, nausea and vomiting.   Endocrine: Negative for cold intolerance, heat intolerance, polydipsia and polyuria.   Genitourinary: Positive for menstrual problem. Negative for difficulty urinating, dysuria, flank pain, frequency and hematuria.   Musculoskeletal: Positive for arthralgias, joint swelling and neck pain. Negative for back pain and myalgias.   Skin: Negative for color change and rash.   Neurological: Negative for dizziness, tremors, weakness, numbness and headaches.   Psychiatric/Behavioral: Negative for confusion, dysphoric mood and sleep disturbance. The patient is not nervous/anxious.        Objective:          A1C:      CBC:  Recent Labs   Lab 04/12/18  1612 10/22/19  0719 07/24/20  0950   WBC 5.60 3.49 L 5.22   RBC 4.22 4.56 4.60   Hemoglobin 13.5 14.2 14.7   Hematocrit 40.6 43.6 45.2   Platelets 290 242 222   Mean Corpuscular Volume 96 96 98   Mean Corpuscular Hemoglobin 32.0 H 31.1 H 32.0 H   Mean Corpuscular Hemoglobin Conc 33.3 32.6 32.5     CMP:  Recent Labs   Lab 10/22/19  0719 07/24/20  0950   Glucose 100 94   Calcium 9.5 10.2   Albumin 4.4 4.6   Total Protein 7.4 7.1   Sodium 139 141   Potassium 3.9 4.6   CO2 26 30 H   Chloride 104 102   BUN, Bld 15 18 H   Creatinine 0.82 0.82   Alkaline Phosphatase 101 93   ALT 33 28   AST 33 33   Total Bilirubin 0.8 0.7     LIPIDS:  Recent Labs   Lab 07/24/20  0950   TSH 1.020     TSH:  Recent Labs   Lab 07/24/20  0950   TSH 1.020           Physical Exam  Constitutional:       Appearance: She is well-developed.   HENT:      Head: Normocephalic and atraumatic.      Right Ear: External ear normal. No middle ear effusion. Tympanic membrane is not  erythematous.      Left Ear: External ear normal.  No middle ear effusion. Tympanic membrane is not erythematous.      Nose: No rhinorrhea.      Right Sinus: No maxillary sinus tenderness or frontal sinus tenderness.      Left Sinus: No maxillary sinus tenderness or frontal sinus tenderness.      Mouth/Throat:      Pharynx: No posterior oropharyngeal erythema.      Tonsils: No tonsillar exudate.   Eyes:      General:         Right eye: No discharge.         Left eye: No discharge.      Conjunctiva/sclera: Conjunctivae normal.      Right eye: Right conjunctiva is not injected.      Left eye: Left conjunctiva is not injected.      Pupils: Pupils are equal, round, and reactive to light.   Neck:      Thyroid: No thyromegaly.   Cardiovascular:      Rate and Rhythm: Normal rate and regular rhythm.      Heart sounds: Normal heart sounds. No murmur.   Pulmonary:      Effort: Pulmonary effort is normal.      Breath sounds: Normal breath sounds. No wheezing, rhonchi or rales.   Abdominal:      General: Bowel sounds are normal. There is no distension.      Tenderness: There is no abdominal tenderness.   Musculoskeletal:         General: No tenderness.   Lymphadenopathy:      Cervical: No cervical adenopathy.   Skin:     General: Skin is warm and dry.      Findings: No lesion or rash.   Neurological:      Cranial Nerves: No cranial nerve deficit.      Deep Tendon Reflexes: Reflexes normal.   Psychiatric:         Mood and Affect: Mood is not anxious or depressed.         Speech: Speech is not rapid and pressured.         Behavior: Behavior normal. Behavior is not agitated or aggressive.               Assessment and Plan:     Problem List Items Addressed This Visit        Neuro    Meningocele spinal - chronic.  Follow-up with neurology.       Cardiac/Vascular    Palpitations - follow-up with Dr. Haque.  Continue verapamil.  Consider changing this because it could be contributing to her constipation.    Relevant Orders    TSH  (Completed)       GI    Irritable bowel syndrome with constipation- chronic.  GI has her on true Jesus Alberto and MiraLax.    Relevant Orders    Comprehensive metabolic panel (Completed)      Other Visit Diagnoses     Weight loss    -  Primary- her weight has been going down because of her use of laxatives.  She wants to consult with a nutritionist about what to eat to maintain her weight.    Relevant Orders    Ambulatory referral/consult to Nutrition Services    Pelvic pain    - she has chronic pelvic pain due to adhesions.  They are not recommending that she have surgery on the adhesions.      Arthropathy    - she appears to have an inflammatory arthropathy.  Check blood test to evaluate for possible rheumatoid arthritis or other rheumatologic conditions    Relevant Orders    CBC auto differential (Completed)    Rheumatoid factor (Completed)    Sedimentation rate (Completed)    GANESH Screen w/Reflex (Completed)          Orders Placed This Encounter    CBC auto differential    Rheumatoid factor    Sedimentation rate    GANESH Screen w/Reflex    TSH    Comprehensive metabolic panel    Ambulatory referral/consult to Nutrition Services         Follow-up  in 3 months.       Ryan Boston MD,  FACP  Internal Medicine

## 2020-08-03 ENCOUNTER — NUTRITION (OUTPATIENT)
Dept: NUTRITION | Facility: CLINIC | Age: 50
End: 2020-08-03
Payer: COMMERCIAL

## 2020-08-03 VITALS — BODY MASS INDEX: 19.74 KG/M2 | WEIGHT: 122.81 LBS | HEIGHT: 66 IN

## 2020-08-03 DIAGNOSIS — R63.4 WEIGHT LOSS: ICD-10-CM

## 2020-08-03 PROCEDURE — 97802 MEDICAL NUTRITION INDIV IN: CPT | Mod: S$GLB,,, | Performed by: DIETITIAN, REGISTERED

## 2020-08-03 PROCEDURE — 97802 PR MED NUTR THER, 1ST, INDIV, EA 15 MIN: ICD-10-PCS | Mod: S$GLB,,, | Performed by: DIETITIAN, REGISTERED

## 2020-08-03 PROCEDURE — 99999 PR PBB SHADOW E&M-EST. PATIENT-LVL III: CPT | Mod: PBBFAC,,, | Performed by: DIETITIAN, REGISTERED

## 2020-08-03 PROCEDURE — 99999 PR PBB SHADOW E&M-EST. PATIENT-LVL III: ICD-10-PCS | Mod: PBBFAC,,, | Performed by: DIETITIAN, REGISTERED

## 2020-08-03 NOTE — PROGRESS NOTES
"Referring Physician:Lindy Boston MD     Reason for visit:No chief complaint on file.     Initial Visit    :1970     Allergies Reviewed  Meds Reviewed    Anthropometrics  Weight:55.7 kg (122 lb 12.7 oz)  Height:5' 6" (1.676 m)  BMI:Body mass index is 19.82 kg/m².   IBW: 130  +/-10%    Meds:  Outpatient Medications Prior to Visit   Medication Sig Dispense Refill    ALPRAZolam (XANAX) 0.5 MG tablet Take 1 tablet (0.5 mg total) by mouth once daily. 10 tablet 0    diclofenac potassium (CAMBIA) 50 mg PwPk Take 1 Package by mouth daily as needed. 9 each 11    famotidine (PEPCID) 40 MG tablet Take 40 mg by mouth nightly as needed.       fluticasone (FLONASE) 50 mcg/actuation nasal spray 1 spray by Each Nare route once daily.      pantoprazole (PROTONIX) 40 MG tablet Take 40 mg by mouth once daily.       plecanatide (TRULANCE) 3 mg Tab Take 3 mg by mouth once daily.      polyethylene glycol (GLYCOLAX) 17 gram PwPk Take by mouth every evening.      prochlorperazine (COMPAZINE) 10 MG tablet Take 1 tablet (10 mg total) by mouth 2 (two) times daily as needed (nausea with headache). 20 tablet 5    verapamil (CALAN-SR) 180 MG CR tablet TAKE 1/2 TABLET ONCE A DAY WITH FOOD (Patient taking differently: 1 tablet daily) 15 tablet 3     No facility-administered medications prior to visit.        Food/Drug Interactions Noted:  reviewed    Vitamins/Supplements/Herbs:  Vitamin D, Multi vitamin, magnesium,Vit C    Labs: Noted    Nutrition Prescription: 1650 Kcals/day( 30kcal/kg IBW),  50g protein( .8g/kg IBW)     Support System:  Lives with her     Diet Hx: Pt is unable to drink milk. She has certain foods can tolerate and not is trail and error. She keeps a list of what works for her.    Breakfast: Waffle, lactose free milk, water, syrup sugar free  Lunch: oatmeal, with creamer, sandwich, water, ham, chips  Dinner: Stews, hamburger steak, vegetables, water     Current activity level and/or physical limitations: "  Exercise once to twice per week.    Motivation to make changes/anticipated barriers and/or expected adherence:  Expect good compliance.    Nutrition-Focus Physical Findings:  underweight      Assessment: Pt attentive asked relevant questions about foods recommended and we reviewed how to read a food label.    Nutrition Diagnosis: Underweight    Recommendations: Pt will increase to 1700 calories to gain weight. She will add more fiber in her diet.      Strategies Implemented:  Boost pudding or boost glucose control to increase calories. Change to dream field pasta that has fiber. She will add 100 calories to each meal. Add 100 calories with snacks. This will increase you to 1700 calories per day to gain a pound per week.    Consultation Time:30 minutes.  Communicated with referring healthcare provider:  Consult note available in pt's Epic chart per MD discretion  Follow Up:0 months.

## 2020-09-28 ENCOUNTER — CLINICAL SUPPORT (OUTPATIENT)
Dept: OTHER | Facility: CLINIC | Age: 50
End: 2020-09-28
Payer: COMMERCIAL

## 2020-09-28 DIAGNOSIS — R92.8 ABNORMAL MAMMOGRAM: Primary | ICD-10-CM

## 2020-09-28 DIAGNOSIS — Z00.8 ENCOUNTER FOR OTHER GENERAL EXAMINATION: ICD-10-CM

## 2020-10-02 VITALS — HEIGHT: 66 IN | BODY MASS INDEX: 19.82 KG/M2

## 2020-10-02 LAB
GLUCOSE SERPL-MCNC: 97 MG/DL (ref 60–140)
HDLC SERPL-MCNC: 101 MG/DL
POC CHOLESTEROL, TOTAL: 194 MG/DL
TRIGL SERPL-MCNC: 71 MG/DL

## 2020-11-09 ENCOUNTER — OFFICE VISIT (OUTPATIENT)
Dept: NEUROLOGY | Facility: CLINIC | Age: 50
End: 2020-11-09
Payer: COMMERCIAL

## 2020-11-09 VITALS
SYSTOLIC BLOOD PRESSURE: 102 MMHG | WEIGHT: 127.63 LBS | TEMPERATURE: 98 F | HEIGHT: 66 IN | BODY MASS INDEX: 20.51 KG/M2 | DIASTOLIC BLOOD PRESSURE: 70 MMHG | HEART RATE: 76 BPM | RESPIRATION RATE: 16 BRPM

## 2020-11-09 DIAGNOSIS — G44.89 CHRONIC MIXED HEADACHE SYNDROME: Primary | ICD-10-CM

## 2020-11-09 DIAGNOSIS — M53.3 SACRAL BACK PAIN: ICD-10-CM

## 2020-11-09 DIAGNOSIS — Q05.9 MENINGOCELE SPINAL: ICD-10-CM

## 2020-11-09 PROCEDURE — 99999 PR PBB SHADOW E&M-EST. PATIENT-LVL III: ICD-10-PCS | Mod: PBBFAC,,, | Performed by: PSYCHIATRY & NEUROLOGY

## 2020-11-09 PROCEDURE — 3008F BODY MASS INDEX DOCD: CPT | Mod: CPTII,S$GLB,, | Performed by: PSYCHIATRY & NEUROLOGY

## 2020-11-09 PROCEDURE — 99214 OFFICE O/P EST MOD 30 MIN: CPT | Mod: S$GLB,,, | Performed by: PSYCHIATRY & NEUROLOGY

## 2020-11-09 PROCEDURE — 3008F PR BODY MASS INDEX (BMI) DOCUMENTED: ICD-10-PCS | Mod: CPTII,S$GLB,, | Performed by: PSYCHIATRY & NEUROLOGY

## 2020-11-09 PROCEDURE — 99214 PR OFFICE/OUTPT VISIT, EST, LEVL IV, 30-39 MIN: ICD-10-PCS | Mod: S$GLB,,, | Performed by: PSYCHIATRY & NEUROLOGY

## 2020-11-09 PROCEDURE — 99999 PR PBB SHADOW E&M-EST. PATIENT-LVL III: CPT | Mod: PBBFAC,,, | Performed by: PSYCHIATRY & NEUROLOGY

## 2020-11-09 NOTE — PROGRESS NOTES
HPI: Mandi Juan is a 48 y.o. female    Patient is here for her yearly follow up    Headaches had been doing well all year. She has had a few more headaches in more recent weeks.  Last week she felt some tightness in the head episodically for a few days then resolved. Thought this was triggered by some neck tightness  Overall, using OTC meds PRN and this helps well.        Piriformis pain is still resolved    Continues to see GI for chronic diarrhea. She has some queasiness associated with meds and often feels a little dizziness / light headedness with dehydration symptoms. She is gaining weight now.   Had CT pelvis this year and meningocele is not increased in size per that report. She has some chronic tailbone pain    Review of Systems   Constitutional: Negative for fever.   HENT: Negative for nosebleeds.    Eyes: Negative for double vision.   Respiratory: Negative for hemoptysis.    Cardiovascular: Negative for leg swelling.   Gastrointestinal: Positive for diarrhea.   Genitourinary: Negative for hematuria.   Musculoskeletal: Positive for back pain.   Skin: Positive for rash.        Patient is seeing dermatology for rash   Neurological: Positive for headaches.   Endo/Heme/Allergies: Does not bruise/bleed easily.   Psychiatric/Behavioral: Negative for memory loss.         I have reviewed all of this patient's past medical and surgical histories as well as family and social histories and active allergies and medications as documented in the electronic medical record.    Exam:  Gen Appearance, well developed/nourished in no apparent distress  CV: 2+ distal pulses with no edema or swelling  Neuro:  MS: Awake, alert,  Sustains attention. Recent/remote memory intact, Language is full to spontaneous speech/comprehension. Fund of Knowledge is full  CN: Optic discs are flat with normal vasculature, PERRL, Extraoccular movements and visual fields are full. Normal facial sensation and strength, Hearing symmetric, Tongue  and Palate are midline and strong. Shoulder Shrug symmetric and strong.  Motor: Normal bulk, tone, no abnormal movements. 5/5 strength bilateral upper/lower extremities with 2+ reflexes and no clonus  Sensory: symmetric to pain, temp, and vibration,  Romberg negative  Cerebellar: Finger-nose,Heal-shin, Rapid alternating movements intact  Gait: Normal stance, no ataxia    10/2018 CTA brain: No acute abnormality. No high-grade stenosis or major vessel occlusion.    Imagin2019 Xray L spine: There is no evidence of an acute fracture.  No focal disk space narrowing.  Align no evidence of spondylolysis or spondylolisthesis.    Assessment/Plan: Mandi Juan is a 50 y.o. female with over 8 year history of migraine with aura, episodic, not intractable.     I recommend:   1. CTA head unremarkable    2. Otherwise, spells are consistent with migraine and for several days of headaches prior- medrol mio helped.   4. She continues verapamil for heart flutter which may be acting like a good prevention medication.   -does not currently need further prevention medications  5. Diclofenac caused some tongue tingling and discoloration- saw ENT about this and was treated for yeast.   -Can Try Fioricet as previously prescribed in low quantity and compazine for rescue if needed (she has not required this)  -Ubrevly trial could also be considered if headaches fail to respond to OTC meds going forward  6. Previously, she declined elavil due to anxiety side with Zoloft and fear of side effects.  She has tried maxalt and relpax and imitrex with jaw tightness and sadness side effects. She has failed Toradol   7. She  had tingling and running sensation down her right leg with radicular pain in 2019. Xray L spine unremarkable in 2019  -She has a known meningocele at the sacral level followed by Dr Wynne at  prior.  -She eventually had relief with dry needling of her piriformis muscle and symptoms are resolved and she does have some  chronic tailbone pain.   -Could consult pain management PRN for any worsening symptoms    RTC 1 year, but notify me sooner if headaches increase or worsen at any point

## 2020-11-23 ENCOUNTER — PATIENT MESSAGE (OUTPATIENT)
Dept: FAMILY MEDICINE | Facility: CLINIC | Age: 50
End: 2020-11-23

## 2020-11-23 ENCOUNTER — OFFICE VISIT (OUTPATIENT)
Dept: FAMILY MEDICINE | Facility: CLINIC | Age: 50
End: 2020-11-23
Payer: COMMERCIAL

## 2020-11-23 VITALS
TEMPERATURE: 98 F | OXYGEN SATURATION: 99 % | SYSTOLIC BLOOD PRESSURE: 102 MMHG | HEART RATE: 89 BPM | BODY MASS INDEX: 20.78 KG/M2 | HEIGHT: 66 IN | WEIGHT: 129.31 LBS | DIASTOLIC BLOOD PRESSURE: 62 MMHG

## 2020-11-23 DIAGNOSIS — R10.31 RIGHT LOWER QUADRANT PAIN: Primary | ICD-10-CM

## 2020-11-23 DIAGNOSIS — R30.0 DYSURIA: ICD-10-CM

## 2020-11-23 LAB
BILIRUB UR QL STRIP: NEGATIVE
CLARITY UR REFRACT.AUTO: CLEAR
COLOR UR AUTO: NORMAL
GLUCOSE UR QL STRIP: NEGATIVE
HGB UR QL STRIP: NEGATIVE
KETONES UR QL STRIP: NEGATIVE
LEUKOCYTE ESTERASE UR QL STRIP: NEGATIVE
NITRITE UR QL STRIP: NEGATIVE
PH UR STRIP: 7 [PH] (ref 5–8)
PROT UR QL STRIP: NEGATIVE
SP GR UR STRIP: 1 (ref 1–1.03)
URN SPEC COLLECT METH UR: NORMAL

## 2020-11-23 PROCEDURE — 3008F BODY MASS INDEX DOCD: CPT | Mod: CPTII,S$GLB,, | Performed by: INTERNAL MEDICINE

## 2020-11-23 PROCEDURE — 99999 PR PBB SHADOW E&M-EST. PATIENT-LVL IV: CPT | Mod: PBBFAC,,, | Performed by: INTERNAL MEDICINE

## 2020-11-23 PROCEDURE — 81003 URINALYSIS AUTO W/O SCOPE: CPT

## 2020-11-23 PROCEDURE — 3008F PR BODY MASS INDEX (BMI) DOCUMENTED: ICD-10-PCS | Mod: CPTII,S$GLB,, | Performed by: INTERNAL MEDICINE

## 2020-11-23 PROCEDURE — 1126F PR PAIN SEVERITY QUANTIFIED, NO PAIN PRESENT: ICD-10-PCS | Mod: S$GLB,,, | Performed by: INTERNAL MEDICINE

## 2020-11-23 PROCEDURE — 99999 PR PBB SHADOW E&M-EST. PATIENT-LVL IV: ICD-10-PCS | Mod: PBBFAC,,, | Performed by: INTERNAL MEDICINE

## 2020-11-23 PROCEDURE — 1126F AMNT PAIN NOTED NONE PRSNT: CPT | Mod: S$GLB,,, | Performed by: INTERNAL MEDICINE

## 2020-11-23 PROCEDURE — 99214 PR OFFICE/OUTPT VISIT, EST, LEVL IV, 30-39 MIN: ICD-10-PCS | Mod: S$GLB,,, | Performed by: INTERNAL MEDICINE

## 2020-11-23 PROCEDURE — 99214 OFFICE O/P EST MOD 30 MIN: CPT | Mod: S$GLB,,, | Performed by: INTERNAL MEDICINE

## 2020-11-23 NOTE — ASSESSMENT & PLAN NOTE
Start with ua and cx  ddx kidney stone, ovarian problem, appendicitis though this is low on differential   Will start with renal us and and pelvic u/s   To er if symptoms worsen, discussed red flag symptoms

## 2021-08-25 ENCOUNTER — CLINICAL SUPPORT (OUTPATIENT)
Dept: OTHER | Facility: CLINIC | Age: 51
End: 2021-08-25
Payer: COMMERCIAL

## 2021-08-25 DIAGNOSIS — Z00.8 ENCOUNTER FOR OTHER GENERAL EXAMINATION: ICD-10-CM

## 2021-08-26 VITALS — HEIGHT: 67 IN | BODY MASS INDEX: 20.25 KG/M2

## 2021-08-26 LAB
GLUCOSE SERPL-MCNC: 103 MG/DL (ref 60–140)
HDLC SERPL-MCNC: 69 MG/DL
POC CHOLESTEROL, LDL (DOCK): 74 MG/DL
POC CHOLESTEROL, TOTAL: 157 MG/DL
TRIGL SERPL-MCNC: 66 MG/DL

## 2021-09-29 ENCOUNTER — IMMUNIZATION (OUTPATIENT)
Dept: PHARMACY | Facility: CLINIC | Age: 51
End: 2021-09-29
Payer: COMMERCIAL

## 2021-10-27 DIAGNOSIS — Z12.31 OTHER SCREENING MAMMOGRAM: ICD-10-CM

## 2022-01-10 ENCOUNTER — PATIENT MESSAGE (OUTPATIENT)
Dept: ADMINISTRATIVE | Facility: HOSPITAL | Age: 52
End: 2022-01-10
Payer: COMMERCIAL

## 2022-02-08 ENCOUNTER — OFFICE VISIT (OUTPATIENT)
Dept: OBSTETRICS AND GYNECOLOGY | Facility: CLINIC | Age: 52
End: 2022-02-08
Payer: COMMERCIAL

## 2022-02-08 VITALS
SYSTOLIC BLOOD PRESSURE: 122 MMHG | HEIGHT: 66 IN | WEIGHT: 139.13 LBS | DIASTOLIC BLOOD PRESSURE: 70 MMHG | BODY MASS INDEX: 22.36 KG/M2

## 2022-02-08 DIAGNOSIS — K62.89 RECTAL PAIN: ICD-10-CM

## 2022-02-08 DIAGNOSIS — Z98.891 S/P CESAREAN SECTION: ICD-10-CM

## 2022-02-08 DIAGNOSIS — R10.2 PELVIC PAIN: Primary | ICD-10-CM

## 2022-02-08 DIAGNOSIS — Z87.19 HISTORY OF CHRONIC CONSTIPATION: ICD-10-CM

## 2022-02-08 PROBLEM — N76.1 SUBACUTE VAGINITIS: Status: RESOLVED | Noted: 2018-03-03 | Resolved: 2022-02-08

## 2022-02-08 PROCEDURE — 1159F MED LIST DOCD IN RCRD: CPT | Mod: CPTII,S$GLB,, | Performed by: OBSTETRICS & GYNECOLOGY

## 2022-02-08 PROCEDURE — 3008F BODY MASS INDEX DOCD: CPT | Mod: CPTII,S$GLB,, | Performed by: OBSTETRICS & GYNECOLOGY

## 2022-02-08 PROCEDURE — 99204 OFFICE O/P NEW MOD 45 MIN: CPT | Mod: S$GLB,,, | Performed by: OBSTETRICS & GYNECOLOGY

## 2022-02-08 PROCEDURE — 1159F PR MEDICATION LIST DOCUMENTED IN MEDICAL RECORD: ICD-10-PCS | Mod: CPTII,S$GLB,, | Performed by: OBSTETRICS & GYNECOLOGY

## 2022-02-08 PROCEDURE — 3008F PR BODY MASS INDEX (BMI) DOCUMENTED: ICD-10-PCS | Mod: CPTII,S$GLB,, | Performed by: OBSTETRICS & GYNECOLOGY

## 2022-02-08 PROCEDURE — 3074F SYST BP LT 130 MM HG: CPT | Mod: CPTII,S$GLB,, | Performed by: OBSTETRICS & GYNECOLOGY

## 2022-02-08 PROCEDURE — 99204 PR OFFICE/OUTPT VISIT, NEW, LEVL IV, 45-59 MIN: ICD-10-PCS | Mod: S$GLB,,, | Performed by: OBSTETRICS & GYNECOLOGY

## 2022-02-08 PROCEDURE — 3074F PR MOST RECENT SYSTOLIC BLOOD PRESSURE < 130 MM HG: ICD-10-PCS | Mod: CPTII,S$GLB,, | Performed by: OBSTETRICS & GYNECOLOGY

## 2022-02-08 PROCEDURE — 3078F DIAST BP <80 MM HG: CPT | Mod: CPTII,S$GLB,, | Performed by: OBSTETRICS & GYNECOLOGY

## 2022-02-08 PROCEDURE — 3078F PR MOST RECENT DIASTOLIC BLOOD PRESSURE < 80 MM HG: ICD-10-PCS | Mod: CPTII,S$GLB,, | Performed by: OBSTETRICS & GYNECOLOGY

## 2022-02-08 RX ORDER — HYOSCYAMINE SULFATE 0.125 MG
125 TABLET ORAL EVERY 4 HOURS PRN
COMMUNITY

## 2022-02-08 RX ORDER — CHLORHEXIDINE GLUCONATE ORAL RINSE 1.2 MG/ML
SOLUTION DENTAL
COMMUNITY
Start: 2021-04-09 | End: 2022-09-21

## 2022-02-08 RX ORDER — FLECAINIDE ACETATE 50 MG/1
50 TABLET ORAL
COMMUNITY

## 2022-02-08 NOTE — PROGRESS NOTES
Subjective:       Patient ID: Mandi Juan is a 51 y.o. female.    Chief Complaint:  Consult (Pelvin/rectal pain )      History of Present Illness  50 yo  presents for a second opinion for pelvic and rectal pain that has been present for approximately 10 years.  PMH significant for recurrent abdominal pain due to redundant colon and history of pelvic adhesive disease suspected from tortuous colon on colonoscopy.  Notes dyspareunia and severe rectal spasms with intercourse.  Notes rectal pain daily with sitting.  Uses daily bowel regimen to manage constipation.      Patient Active Problem List   Diagnosis    GERD (gastroesophageal reflux disease)    Migraines    Irritable bowel syndrome with constipation    Anxiety    Radicular pain    Piriformis syndrome of left side    Meningocele spinal    Palpitations    Right lower quadrant pain       Past Medical History:   Diagnosis Date    Anxiety     GERD (gastroesophageal reflux disease)     Irritable bowel syndrome     Migraines     Palpitations     UTI (urinary tract infection)        Past Surgical History:   Procedure Laterality Date     SECTION      COLONOSCOPY      TONSILLECTOMY         OB History    Para Term  AB Living   2 2 2     3   SAB IAB Ectopic Multiple Live Births         1 2      # Outcome Date GA Lbr Heron/2nd Weight Sex Delivery Anes PTL Lv   2A Term      CS-Unspec      2B Term            1 Term      Vag-Spont         Obstetric Comments   /   No abnormal pap   No STDs    x 1, CD x 1  (TIUP 37 weeks)       No LMP recorded (lmp unknown).   Date of Last Pap: No result found    Review of Systems  Review of Systems   Constitutional: Negative for activity change, chills, fatigue, fever and unexpected weight change.   Respiratory: Negative for shortness of breath.    Cardiovascular: Negative for chest pain.   Gastrointestinal: Positive for abdominal pain, constipation and diarrhea. Negative for blood in stool,  "nausea and vomiting.   Endocrine: Negative for cold intolerance and heat intolerance.   Genitourinary: Positive for dyspareunia. Negative for bladder incontinence, dysuria, flank pain, frequency, hematuria, hot flashes, urgency, vaginal bleeding and vaginal dryness.   Musculoskeletal: Negative for back pain.   Integumentary:  Negative for rash, breast mass, breast discharge and breast tenderness.   Neurological: Positive for headaches.   Psychiatric/Behavioral: Negative for dysphoric mood. The patient is not nervous/anxious.    Breast: Negative for mass and tenderness      Answers for HPI/ROS submitted by the patient on 2022  Chronicity: chronic  Onset: more than 1 year ago  Frequency: intermittently  Progression since onset: waxing and waning  Pain severity: moderate  Affected side: both  Pregnant now?: No  anorexia: No  discolored urine: No  painful intercourse: Yes  Aggravated by: intercourse, tactile pressure  treatments tried: warm bath  Improvement on treatment: no relief  Sexual activity: sexually active  Partner with STD symptoms: no  Birth control: vasectomy  Menstrual history: postmenopausal  STD: No  abdominal surgery: No   section: Yes  Ectopic pregnancy: No  Endometriosis: Yes  herpes simplex: No  gynecological surgery: No  menorrhagia: No  metrorrhagia: No  miscarriage: No  ovarian cysts: Yes  perineal abscess: No  PID: No  terminated pregnancy: No  vaginosis: No         Objective:   /70   Ht 5' 6" (1.676 m)   Wt 63.1 kg (139 lb 1.8 oz)   LMP  (LMP Unknown)   BMI 22.45 kg/m²   Body mass index is 22.45 kg/m².    APPEARANCE: Well nourished, well developed, in no acute distress.  PSYCH: Appropriate mood and affect.  SKIN: No acne or hirsutism  NECK: Neck symmetric without masses or thyromegaly  NODES: No inguinal, axillary, or supraclavicular lymph node enlargement  ABDOMEN: Soft.  No tenderness or masses.    CARDIOVASCULAR: No edema of peripheral extremities  PELVIC: Normal external " genitalia without lesions.  Normal hair distribution.  Adequate perineal body, normal urethral meatus.  Vagina moist and well rugated without lesions or discharge.  Cervix pink, without lesions, discharge or tenderness.  No significant cystocele or rectocele.  Bimanual exam shows uterus to be normal size, regular, mobile and nontender.  Adnexa without masses or tenderness.       EXAMINATION:  US PELVIS COMP WITH TRANSVAG NON-OB (XPD)     CLINICAL HISTORY:  Right lower quadrant pain     TECHNIQUE:  Transabdominal sonography of the pelvis was performed, followed by transvaginal sonography to better evaluate the uterus and ovaries.     FINDINGS:  The uterus measures 8.7 x 3.6 x 6.6 cm.  The endometrial stripe measures 0.5 cm.  There is a subcentimeter cystic structure within the endometrial canal as well as a subcentimeter echogenic focus which is too small to characterize.  The right and left ovaries measure 5.1 x 3 x 3.2 cm and 3.6 x 2.4 x 3.2 cm respectively.  The right ovary contains several large cysts versus a single larger cyst containing multiple septations.  There is a 2.5 x 2.3 x 2.3 cm complex left-sided ovarian cyst containing internal septation and echogenic material.  There is no adnexal mass or significant fluid within the pelvis.     Impression:     Large complex ovarian cysts.  Neoplasm is not excluded recommend follow-up examination in 6 weeks.        Electronically signed by: Westley Guzman MD  Date:                                            2020  Time:                                           11:22      Assessment/ Plan:     1. Pelvic pain  US Pelvis Comp with Transvag NON-OB (xpd    Ambulatory referral/consult to Physical/Occupational Therapy   2. Rectal pain  X-Ray Abdomen AP 1 View    Ambulatory referral/consult to Physical/Occupational Therapy   3. History of chronic constipation  X-Ray Abdomen AP 1 View   4. S/P  section         No follow-ups on file.            Eleni Alaniz,  MD [facility]  02/08/2022

## 2022-02-09 ENCOUNTER — TELEPHONE (OUTPATIENT)
Dept: OBSTETRICS AND GYNECOLOGY | Facility: CLINIC | Age: 52
End: 2022-02-09

## 2022-02-09 DIAGNOSIS — R93.5 ABNORMAL X-RAY OF ABDOMEN: Primary | ICD-10-CM

## 2022-02-09 NOTE — TELEPHONE ENCOUNTER
Called to review KUB and pelvic US.    Recommend renal US to eval for kidney stones.         Eleni Alaniz MD  [facility]  02/09/2022

## 2022-02-11 PROBLEM — R52 PAIN: Status: ACTIVE | Noted: 2022-02-11

## 2022-02-11 PROBLEM — N39.46 MIXED STRESS AND URGE URINARY INCONTINENCE: Status: ACTIVE | Noted: 2022-02-11

## 2022-06-16 ENCOUNTER — OFFICE VISIT (OUTPATIENT)
Dept: INTERNAL MEDICINE | Facility: CLINIC | Age: 52
End: 2022-06-16
Payer: COMMERCIAL

## 2022-06-16 VITALS
BODY MASS INDEX: 20.76 KG/M2 | DIASTOLIC BLOOD PRESSURE: 70 MMHG | HEIGHT: 66 IN | OXYGEN SATURATION: 98 % | HEART RATE: 97 BPM | SYSTOLIC BLOOD PRESSURE: 110 MMHG | WEIGHT: 129.19 LBS

## 2022-06-16 DIAGNOSIS — R51.9 NONINTRACTABLE HEADACHE, UNSPECIFIED CHRONICITY PATTERN, UNSPECIFIED HEADACHE TYPE: Primary | ICD-10-CM

## 2022-06-16 DIAGNOSIS — Z20.822 CLOSE EXPOSURE TO COVID-19 VIRUS: ICD-10-CM

## 2022-06-16 DIAGNOSIS — U07.1 COVID-19: ICD-10-CM

## 2022-06-16 LAB
CTP QC/QA: YES
SARS-COV-2 RDRP RESP QL NAA+PROBE: POSITIVE

## 2022-06-16 PROCEDURE — U0002 COVID-19 LAB TEST NON-CDC: HCPCS | Mod: QW,S$GLB,, | Performed by: INTERNAL MEDICINE

## 2022-06-16 PROCEDURE — 99214 PR OFFICE/OUTPT VISIT, EST, LEVL IV, 30-39 MIN: ICD-10-PCS | Mod: S$GLB,,, | Performed by: INTERNAL MEDICINE

## 2022-06-16 PROCEDURE — 99999 PR PBB SHADOW E&M-EST. PATIENT-LVL IV: CPT | Mod: PBBFAC,,, | Performed by: INTERNAL MEDICINE

## 2022-06-16 PROCEDURE — 3008F BODY MASS INDEX DOCD: CPT | Mod: CPTII,S$GLB,, | Performed by: INTERNAL MEDICINE

## 2022-06-16 PROCEDURE — 3078F PR MOST RECENT DIASTOLIC BLOOD PRESSURE < 80 MM HG: ICD-10-PCS | Mod: CPTII,S$GLB,, | Performed by: INTERNAL MEDICINE

## 2022-06-16 PROCEDURE — 3074F PR MOST RECENT SYSTOLIC BLOOD PRESSURE < 130 MM HG: ICD-10-PCS | Mod: CPTII,S$GLB,, | Performed by: INTERNAL MEDICINE

## 2022-06-16 PROCEDURE — 3074F SYST BP LT 130 MM HG: CPT | Mod: CPTII,S$GLB,, | Performed by: INTERNAL MEDICINE

## 2022-06-16 PROCEDURE — 3078F DIAST BP <80 MM HG: CPT | Mod: CPTII,S$GLB,, | Performed by: INTERNAL MEDICINE

## 2022-06-16 PROCEDURE — 99214 OFFICE O/P EST MOD 30 MIN: CPT | Mod: S$GLB,,, | Performed by: INTERNAL MEDICINE

## 2022-06-16 PROCEDURE — 1159F PR MEDICATION LIST DOCUMENTED IN MEDICAL RECORD: ICD-10-PCS | Mod: CPTII,S$GLB,, | Performed by: INTERNAL MEDICINE

## 2022-06-16 PROCEDURE — 1159F MED LIST DOCD IN RCRD: CPT | Mod: CPTII,S$GLB,, | Performed by: INTERNAL MEDICINE

## 2022-06-16 PROCEDURE — 99999 PR PBB SHADOW E&M-EST. PATIENT-LVL IV: ICD-10-PCS | Mod: PBBFAC,,, | Performed by: INTERNAL MEDICINE

## 2022-06-16 PROCEDURE — 3008F PR BODY MASS INDEX (BMI) DOCUMENTED: ICD-10-PCS | Mod: CPTII,S$GLB,, | Performed by: INTERNAL MEDICINE

## 2022-06-16 PROCEDURE — U0002: ICD-10-PCS | Mod: QW,S$GLB,, | Performed by: INTERNAL MEDICINE

## 2022-06-16 NOTE — Clinical Note
NOTE AND POCT ORDER SIGNED Please let pt know instruction and OTC treatement can be found on the portal under todays visit

## 2022-06-16 NOTE — PATIENT INSTRUCTIONS
You were positive for covid 19  You can treat symptoms with OTC meds sucha s tylenol and ibuprofen for fever and pain, OTC antihistamine (Zyrtec, Claritin, Allegra, or Xyxal) and a steroid nasal spray such as flonase as well as decongestant/expectorant such a mucinex d for congestion and pos nasal drip       Please isolate yourself at home.  You may leave home and/or return to work once the following conditions are met:    If you have symptoms and tested positive:  More than 5 days since symptoms first appeared AND  More than 24 hours fever free without medications AND       symptoms have improved   For five days after ending isolation, masks are required.

## 2022-06-16 NOTE — PROGRESS NOTES
[facility] Note    Chief Complaint      Chief Complaint   Patient presents with    Arthritis     Hand and joint pain    Hemorrhoids    COVID TESTING     Was around a positive person took home test was negative      History of Present Illness      Mandi Juan is a 51 y.o. female who presents today for chief complaint covid concerns . HPI   Sore throat, post nasal drip, headahce for one days. Has neem continuously exposed to grandson who is covid pos. Had negative home test   Active Problem List with Overview Notes    Diagnosis Date Noted    COVID-19 2022    Pain 2022    Mixed stress and urge urinary incontinence 2022    Right lower quadrant pain 2020    Meningocele spinal 2020    Palpitations 2020    Radicular pain 2019    Piriformis syndrome of left side 2019    GERD (gastroesophageal reflux disease)     Migraines     Irritable bowel syndrome with constipation     Anxiety        Health Maintenance   Topic Date Due    Hepatitis C Screening  Never done    Mammogram  2022    Lipid Panel  2026    TETANUS VACCINE  2031       Past Medical History:   Diagnosis Date    Anxiety     GERD (gastroesophageal reflux disease)     Irritable bowel syndrome     Migraines     Palpitations     UTI (urinary tract infection)        Past Surgical History:   Procedure Laterality Date     SECTION      COLONOSCOPY      TONSILLECTOMY         family history includes Arthritis in her father; Breast cancer in her paternal aunt; Cancer in her maternal grandmother; Colon cancer in her maternal grandmother; Diabetes in her paternal grandmother; Heart disease in her daughter; No Known Problems in her brother, brother, daughter, and son; Osteoporosis in her mother; Ovarian cancer in her paternal aunt.    Social History     Tobacco Use    Smoking status: Never Smoker    Smokeless tobacco: Never Used   Substance Use Topics     Alcohol use: Yes     Comment: Seldomly drink    Drug use: No       Review of Systems   Constitutional: Positive for malaise/fatigue. Negative for fever.   HENT: Positive for congestion and sore throat. Negative for hearing loss.    Eyes: Negative for discharge.   Respiratory: Negative for wheezing.    Cardiovascular: Positive for palpitations. Negative for chest pain.   Gastrointestinal: Negative for blood in stool, constipation, diarrhea and vomiting.   Genitourinary: Negative for dysuria and hematuria.   Musculoskeletal: Negative for neck pain.   Neurological: Positive for headaches. Negative for weakness.   Endo/Heme/Allergies: Negative for polydipsia.        Outpatient Encounter Medications as of 6/16/2022   Medication Sig Note Dispense Refill    chlorhexidine (PERIDEX) 0.12 % solution 15mL       famotidine (PEPCID) 40 MG tablet Take 40 mg by mouth nightly as needed.  10/28/2016: Received from: External Pharmacy      flecainide (TAMBOCOR) 50 MG Tab Take 50 mg by mouth every 12 (twelve) hours.       fluticasone propionate (FLONASE) 50 mcg/actuation nasal spray 1 spray by Each Nare route once daily. 7/24/2020: Prn       hyoscyamine (ANASPAZ,LEVSIN) 0.125 mg Tab Take 125 mcg by mouth every 4 (four) hours as needed.       norethindrone ac-eth estradiol (LOESTRIN 1.5/30, 21, ORAL)        plecanatide (TRULANCE) 3 mg Tab Take 3 mg by mouth once daily.       polyethylene glycol (GLYCOLAX) 17 gram PwPk Take by mouth every evening.       prochlorperazine (COMPAZINE) 10 MG tablet Take 1 tablet (10 mg total) by mouth 2 (two) times daily as needed (nausea with headache). 7/24/2020: Prn only  20 tablet 5    verapamil (CALAN-SR) 180 MG CR tablet TAKE 1/2 TABLET ONCE A DAY WITH FOOD (Patient taking differently: Take 180 mg by mouth once daily.)  15 tablet 3     No facility-administered encounter medications on file as of 6/16/2022.        Review of patient's allergies indicates:   Allergen Reactions    Biaxin  "[clarithromycin]     Imitrex [sumatriptan succinate] Palpitations    Maxalt [rizatriptan] Palpitations           Physical Exam      Vital Signs  Pulse: 97  SpO2: 98 %  BP: 110/70  BP Location: Right arm  Patient Position: Sitting  Pain Score: 0-No pain  Height and Weight  Height: 5' 6" (167.6 cm)  Weight: 58.6 kg (129 lb 3 oz)  BSA (Calculated - sq m): 1.65 sq meters  BMI (Calculated): 20.9  Weight in (lb) to have BMI = 25: 154.6]    Physical Exam  Vitals reviewed.   Constitutional:       General: She is not in acute distress.     Appearance: She is well-developed. She is not diaphoretic.   HENT:      Head: Normocephalic and atraumatic.      Right Ear: External ear normal.      Left Ear: External ear normal.      Nose: Nose normal.   Eyes:      General:         Right eye: No discharge.         Left eye: No discharge.      Conjunctiva/sclera: Conjunctivae normal.   Pulmonary:      Effort: Pulmonary effort is normal. No respiratory distress.   Musculoskeletal:         General: Normal range of motion.      Cervical back: Normal range of motion.   Skin:     Coloration: Skin is not pale.      Findings: No rash.   Neurological:      Mental Status: She is alert and oriented to person, place, and time.   Psychiatric:         Behavior: Behavior normal.         Thought Content: Thought content normal.         Judgment: Judgment normal.          Laboratory:  CBC:  No results for input(s): WBC, RBC, HGB, HCT, PLT, MCV, MCH, MCHC in the last 2160 hours.  CMP:  No results for input(s): GLU, CALCIUM, ALBUMIN, PROT, NA, K, CO2, CL, BUN, ALKPHOS, ALT, AST, BILITOT in the last 2160 hours.    Invalid input(s): CREATININ  URINALYSIS:  No results for input(s): COLORU, CLARITYU, SPECGRAV, PHUR, PROTEINUA, GLUCOSEU, BILIRUBINCON, BLOODU, WBCU, RBCU, BACTERIA, MUCUS, NITRITE, LEUKOCYTESUR, UROBILINOGEN, HYALINECASTS in the last 2160 hours.   LIPIDS:  No results for input(s): TSH, HDL, CHOL, TRIG, LDLCALC, CHOLHDL, NONHDLCHOL, " TOTALCHOLEST in the last 2160 hours.  TSH:  No results for input(s): TSH in the last 2160 hours.  A1C:  No results for input(s): HGBA1C in the last 2160 hours.      Assessment/Plan     Mandi Juan is a 51 y.o.female with:    1. Nonintractable headache, unspecified chronicity pattern, unspecified headache type  -     POCT COVID-19 Rapid Screening    2. COVID-19  Assessment & Plan:  You were positive for covid 19  You can treat symptoms with OTC meds sucha s tylenol and ibuprofen for fever and pain, OTC antihistamine (Zyrtec, Claritin, Allegra, or Xyxal) and a steroid nasal spray such as flonase as well as decongestant/expectorant such a mucinex d for congestion and pos nasal drip       Please isolate yourself at home.  You may leave home and/or return to work once the following conditions are met:    If you have symptoms and tested positive:   More than 5 days since symptoms first appeared AND   More than 24 hours fever free without medications AND       symptoms have improved   · For five days after ending isolation, masks are required.        3. Close exposure to COVID-19 virus  -     POCT COVID-19 Rapid Screening       Marie Olivas MD  6/16/2022 8:46 AM    [facility]

## 2022-06-16 NOTE — ASSESSMENT & PLAN NOTE
You were positive for covid 19  You can treat symptoms with OTC meds sucha s tylenol and ibuprofen for fever and pain, OTC antihistamine (Zyrtec, Claritin, Allegra, or Xyxal) and a steroid nasal spray such as flonase as well as decongestant/expectorant such a mucinex d for congestion and pos nasal drip       Please isolate yourself at home.  You may leave home and/or return to work once the following conditions are met:    If you have symptoms and tested positive:   More than 5 days since symptoms first appeared AND   More than 24 hours fever free without medications AND       symptoms have improved   · For five days after ending isolation, masks are required.

## 2022-08-23 ENCOUNTER — PATIENT MESSAGE (OUTPATIENT)
Dept: OBSTETRICS AND GYNECOLOGY | Facility: CLINIC | Age: 52
End: 2022-08-23
Payer: COMMERCIAL

## 2022-08-23 DIAGNOSIS — N93.9 ABNORMAL UTERINE BLEEDING (AUB): Primary | ICD-10-CM

## 2022-08-24 ENCOUNTER — TELEPHONE (OUTPATIENT)
Dept: OBSTETRICS AND GYNECOLOGY | Facility: CLINIC | Age: 52
End: 2022-08-24
Payer: COMMERCIAL

## 2022-08-24 NOTE — TELEPHONE ENCOUNTER
Patient scheduled for US and labs. Also scheduled for follow up.   Patient expressed understanding.  Will resend med rec request as soon as possible.

## 2022-09-08 ENCOUNTER — TELEPHONE (OUTPATIENT)
Dept: INTERNAL MEDICINE | Facility: CLINIC | Age: 52
End: 2022-09-08

## 2022-09-08 ENCOUNTER — OFFICE VISIT (OUTPATIENT)
Dept: INTERNAL MEDICINE | Facility: CLINIC | Age: 52
End: 2022-09-08
Payer: COMMERCIAL

## 2022-09-08 VITALS
WEIGHT: 124.56 LBS | TEMPERATURE: 99 F | DIASTOLIC BLOOD PRESSURE: 68 MMHG | OXYGEN SATURATION: 98 % | HEART RATE: 77 BPM | HEIGHT: 66 IN | SYSTOLIC BLOOD PRESSURE: 110 MMHG | BODY MASS INDEX: 20.02 KG/M2

## 2022-09-08 DIAGNOSIS — K58.1 IRRITABLE BOWEL SYNDROME WITH CONSTIPATION: ICD-10-CM

## 2022-09-08 DIAGNOSIS — R53.83 FATIGUE, UNSPECIFIED TYPE: ICD-10-CM

## 2022-09-08 DIAGNOSIS — R00.2 PALPITATIONS: ICD-10-CM

## 2022-09-08 DIAGNOSIS — M25.50 MULTIPLE JOINT PAIN: ICD-10-CM

## 2022-09-08 DIAGNOSIS — Z00.00 WELLNESS EXAMINATION: ICD-10-CM

## 2022-09-08 DIAGNOSIS — Z11.59 ENCOUNTER FOR HEPATITIS C SCREENING TEST FOR LOW RISK PATIENT: Primary | ICD-10-CM

## 2022-09-08 PROBLEM — M54.10 RADICULAR PAIN: Status: RESOLVED | Noted: 2019-06-11 | Resolved: 2022-09-08

## 2022-09-08 PROBLEM — R10.31 RIGHT LOWER QUADRANT PAIN: Status: RESOLVED | Noted: 2020-11-23 | Resolved: 2022-09-08

## 2022-09-08 PROBLEM — U07.1 COVID-19: Status: RESOLVED | Noted: 2022-06-16 | Resolved: 2022-09-08

## 2022-09-08 PROBLEM — R52 PAIN: Status: RESOLVED | Noted: 2022-02-11 | Resolved: 2022-09-08

## 2022-09-08 PROBLEM — G57.02 PIRIFORMIS SYNDROME OF LEFT SIDE: Status: RESOLVED | Noted: 2019-06-11 | Resolved: 2022-09-08

## 2022-09-08 PROCEDURE — 3078F PR MOST RECENT DIASTOLIC BLOOD PRESSURE < 80 MM HG: ICD-10-PCS | Mod: CPTII,S$GLB,, | Performed by: INTERNAL MEDICINE

## 2022-09-08 PROCEDURE — 99999 PR PBB SHADOW E&M-EST. PATIENT-LVL III: ICD-10-PCS | Mod: PBBFAC,,, | Performed by: INTERNAL MEDICINE

## 2022-09-08 PROCEDURE — 3074F SYST BP LT 130 MM HG: CPT | Mod: CPTII,S$GLB,, | Performed by: INTERNAL MEDICINE

## 2022-09-08 PROCEDURE — 3008F BODY MASS INDEX DOCD: CPT | Mod: CPTII,S$GLB,, | Performed by: INTERNAL MEDICINE

## 2022-09-08 PROCEDURE — 99214 PR OFFICE/OUTPT VISIT, EST, LEVL IV, 30-39 MIN: ICD-10-PCS | Mod: S$GLB,,, | Performed by: INTERNAL MEDICINE

## 2022-09-08 PROCEDURE — 99214 OFFICE O/P EST MOD 30 MIN: CPT | Mod: S$GLB,,, | Performed by: INTERNAL MEDICINE

## 2022-09-08 PROCEDURE — 3078F DIAST BP <80 MM HG: CPT | Mod: CPTII,S$GLB,, | Performed by: INTERNAL MEDICINE

## 2022-09-08 PROCEDURE — 99999 PR PBB SHADOW E&M-EST. PATIENT-LVL III: CPT | Mod: PBBFAC,,, | Performed by: INTERNAL MEDICINE

## 2022-09-08 PROCEDURE — 1159F PR MEDICATION LIST DOCUMENTED IN MEDICAL RECORD: ICD-10-PCS | Mod: CPTII,S$GLB,, | Performed by: INTERNAL MEDICINE

## 2022-09-08 PROCEDURE — 1159F MED LIST DOCD IN RCRD: CPT | Mod: CPTII,S$GLB,, | Performed by: INTERNAL MEDICINE

## 2022-09-08 PROCEDURE — 3008F PR BODY MASS INDEX (BMI) DOCUMENTED: ICD-10-PCS | Mod: CPTII,S$GLB,, | Performed by: INTERNAL MEDICINE

## 2022-09-08 PROCEDURE — 3074F PR MOST RECENT SYSTOLIC BLOOD PRESSURE < 130 MM HG: ICD-10-PCS | Mod: CPTII,S$GLB,, | Performed by: INTERNAL MEDICINE

## 2022-09-08 NOTE — LETTER
AUTHORIZATION FOR RELEASE OF   CONFIDENTIAL INFORMATION    Dear Dr. Haque,    We are seeing Mandi Juan, date of birth 1970, in the clinic at [facility]. Marie Olivas MD is the patient's PCP. Mandi Juan has an outstanding lab/procedure at the time we reviewed her chart. In order to help keep her health information updated, she has authorized us to request the following medical record(s):        (  )  MAMMOGRAM                                      (  )  COLONOSCOPY      (  )  PAP SMEAR                                          (  )  OUTSIDE LAB RESULTS     (  )  DEXA SCAN                                          (  )  EYE EXAM            (  )  FOOT EXAM                                          (  )  ENTIRE RECORD     (  )  OUTSIDE IMMUNIZATIONS                 (X)  Cardiology records         Please fax records to [facility], Abby E. Gandolfi, MD, [phone]     If you have any questions, please contact Di at [phone].           Patient Name: Mandi Juan  : 1970  Patient Phone #: [phone]

## 2022-09-08 NOTE — TELEPHONE ENCOUNTER
----- Message from Marie Olivas MD sent at 9/8/2022  9:10 AM CDT -----  Records dr childers cardiology

## 2022-09-08 NOTE — PROGRESS NOTES
"[facility] Note    Chief Complaint      Chief Complaint   Patient presents with    Generalized Body Aches     Joint pains and aches, family hx of arthritis     Rash     Eval spot on middle of back that had been itching    Dizziness     History of Present Illness      Mandi Juan is a 52 y.o. female who presents today for chief complaint joint pain . Patient previously seen by me    PCP: Marie Olivas MD  Patient comes to appointment alone.     HPI   Seen 11.20 to [facility] and again in June of this year for covid   Sees cardiology Dr Haque   Has been feeling fatigued and "off", slowed mentation since covid pos in June, he tx her with steroids which helped a little bit, shes using flecainide prn for palpitations, unsure her arrhythmia dx.   Also some dizziness   Did have mild covid     Had labs with Dr Alaniz metrorrhagia, has an appt next week, had CBC TSH and ovarian cysts,    Has been having multiple joint pain, has fam hx RA, mostly hand pain dip and pip, also thumb pain, some other generalized joint pain, sees ortho for joint pain sees [facility], exercise is limited bc hemorrhoid pain with activity     Having hemorrhoid banding tomorrow with [facility], sees AMAIRANI Warren, no hx IBD, has a lot of adhseions post c-sec and redundant colon, is on miralax and trulance     Unintentional weight loss, 15# this year, but this is her baselin per spreadsheet, butch weight represents a gain     Prior notes   Hx of recurrent abd pain bc of redundat colon, sees Dr Sawyer BALES   Tilted uterus from c section, gyn dr lovett ocp had a lot of ovulatory pain  meningocoele at the end of spine, has rectal pain   S  Active Problem List with Overview Notes    Diagnosis Date Noted    Multiple joint pain 09/08/2022    Fatigue 09/08/2022    Mixed stress and urge urinary incontinence 02/11/2022    Meningocele spinal 07/24/2020    Palpitations 07/24/2020    GERD (gastroesophageal reflux disease)     Migraines  "    Irritable bowel syndrome with constipation     Anxiety        Health Maintenance   Topic Date Due    Hepatitis C Screening  Never done    Mammogram  2022    Lipid Panel  2026    TETANUS VACCINE  2031       Past Medical History:   Diagnosis Date    Anxiety     GERD (gastroesophageal reflux disease)     Irritable bowel syndrome     Migraines     Palpitations     UTI (urinary tract infection)        Past Surgical History:   Procedure Laterality Date     SECTION      COLONOSCOPY      TONSILLECTOMY         family history includes Arthritis in her father, paternal aunt, and paternal aunt; Breast cancer in her paternal aunt; Cancer in her maternal grandmother; Colon cancer in her maternal grandmother; Diabetes in her paternal grandmother; Heart disease in her daughter; No Known Problems in her brother, brother, daughter, and son; Osteoporosis in her mother; Ovarian cancer in her paternal aunt.    Social History     Tobacco Use    Smoking status: Never    Smokeless tobacco: Never   Substance Use Topics    Alcohol use: Yes     Comment: Seldomly drink    Drug use: No       Review of Systems   Constitutional:  Negative for chills, fever, malaise/fatigue and weight loss.   HENT:  Negative for hearing loss.    Eyes:  Negative for discharge.   Respiratory:  Negative for cough, sputum production, shortness of breath and wheezing.    Cardiovascular:  Positive for palpitations. Negative for chest pain, orthopnea and leg swelling.   Gastrointestinal:  Positive for constipation and diarrhea. Negative for blood in stool, nausea and vomiting.   Genitourinary:  Negative for dysuria, frequency, hematuria and urgency.   Musculoskeletal:  Negative for neck pain.   Neurological:  Negative for weakness and headaches.   Endo/Heme/Allergies:  Negative for polydipsia.      Outpatient Encounter Medications as of 2022   Medication Sig Note Dispense Refill    famotidine (PEPCID) 40 MG tablet Take 40 mg by mouth  "nightly as needed.  10/28/2016: Received from: External Pharmacy      flecainide (TAMBOCOR) 50 MG Tab Take 50 mg by mouth every 12 (twelve) hours.       hyoscyamine (ANASPAZ,LEVSIN) 0.125 mg Tab Take 125 mcg by mouth every 4 (four) hours as needed.       plecanatide (TRULANCE) 3 mg Tab Take 3 mg by mouth once daily.       prochlorperazine (COMPAZINE) 10 MG tablet Take 1 tablet (10 mg total) by mouth 2 (two) times daily as needed (nausea with headache). 7/24/2020: Prn only  20 tablet 5    verapamil (CALAN-SR) 180 MG CR tablet TAKE 1/2 TABLET ONCE A DAY WITH FOOD (Patient taking differently: Take 180 mg by mouth once daily.)  15 tablet 3    chlorhexidine (PERIDEX) 0.12 % solution 15mL       fluticasone propionate (FLONASE) 50 mcg/actuation nasal spray 1 spray by Each Nare route once daily. 7/24/2020: Prn       norethindrone ac-eth estradiol (LOESTRIN 1.5/30, 21, ORAL)        polyethylene glycol (GLYCOLAX) 17 gram PwPk Take by mouth every evening.        No facility-administered encounter medications on file as of 9/8/2022.        Review of patient's allergies indicates:   Allergen Reactions    Biaxin [clarithromycin]     Imitrex [sumatriptan succinate] Palpitations    Maxalt [rizatriptan] Palpitations           Physical Exam      Vital Signs  Temp: 98.5 °F (36.9 °C)  Pulse: 77  SpO2: 98 %  BP: 110/68  BP Location: Left arm  Patient Position: Sitting  Height and Weight  Height: 5' 6" (167.6 cm)  Weight: 56.5 kg (124 lb 9 oz)  BSA (Calculated - sq m): 1.62 sq meters  BMI (Calculated): 20.1  Weight in (lb) to have BMI = 25: 154.6]    Physical Exam  Vitals reviewed.   Constitutional:       General: She is not in acute distress.     Appearance: She is well-developed. She is not diaphoretic.   HENT:      Head: Normocephalic and atraumatic.      Right Ear: External ear normal.      Left Ear: External ear normal.      Nose: Nose normal.   Eyes:      General:         Right eye: No discharge.         Left eye: No discharge.     "  Conjunctiva/sclera: Conjunctivae normal.   Neck:      Thyroid: No thyromegaly.   Cardiovascular:      Rate and Rhythm: Normal rate and regular rhythm.      Heart sounds: Normal heart sounds.   Pulmonary:      Effort: Pulmonary effort is normal. No respiratory distress.      Breath sounds: Normal breath sounds.   Musculoskeletal:         General: Normal range of motion.      Right hand: No swelling, deformity or tenderness.      Left hand: No swelling, deformity or tenderness.      Cervical back: Normal range of motion.   Skin:     Coloration: Skin is not pale.      Findings: No rash.   Neurological:      Mental Status: She is alert and oriented to person, place, and time.   Psychiatric:         Behavior: Behavior normal.         Thought Content: Thought content normal.        Laboratory:  CBC:  Recent Labs   Lab Result Units 08/25/22  1555   WBC K/uL 7.77   RBC M/uL 4.57   Hemoglobin g/dL 14.4   Hematocrit % 43.7   Platelets K/uL 275   MCV fL 96   MCH pg 31.5*   MCHC g/dL 33.0     CMP:  No results for input(s): GLU, CALCIUM, ALBUMIN, PROT, NA, K, CO2, CL, BUN, ALKPHOS, ALT, AST, BILITOT in the last 2160 hours.    Invalid input(s): CREATININ  URINALYSIS:  No results for input(s): COLORU, CLARITYU, SPECGRAV, PHUR, PROTEINUA, GLUCOSEU, BILIRUBINCON, BLOODU, WBCU, RBCU, BACTERIA, MUCUS, NITRITE, LEUKOCYTESUR, UROBILINOGEN, HYALINECASTS in the last 2160 hours.   LIPIDS:  Recent Labs   Lab Result Units 08/25/22  1555   TSH uIU/mL 1.280     TSH:  Recent Labs   Lab Result Units 08/25/22  1555   TSH uIU/mL 1.280     A1C:  No results for input(s): HGBA1C in the last 2160 hours.      Assessment/Plan     Mandi Juan is a 52 y.o.female with:    1. Encounter for hepatitis C screening test for low risk patient  -     Hepatitis C Antibody    2. Palpitations  Assessment & Plan:  Get [facility] records       3. Multiple joint pain  Assessment & Plan:  R/o inflammatory arthritis  Follow up [facility] for thumb pain      Orders:  -     CYCLIC CITRUL PEPTIDE ANTIBODY, IGG  -     Rheumatoid Factor    4. Wellness examination  -     TSH  -     Comprehensive Metabolic Panel  -     Lipid Panel    5. Irritable bowel syndrome with constipation  Assessment & Plan:  Per gi      6. Fatigue, unspecified type  Assessment & Plan:  Suspect this is all post covid, monitor          Use of the [employer] Patient Portal discussed and encouraged during today's visit  -Continue current medications and maintain follow up with specialists. 3 months annual   Future Appointments   Date Time Provider Department Center   9/12/2022  7:30 AM [facility]   9/13/2022 10:00 AM Eleni Alaniz MD [facility]   10/19/2022  9:15 AM Eleni Alaniz MD [facility]       Marie Olivas MD  9/8/2022 9:06 AM    Primary Care Internal Medicine

## 2022-09-13 ENCOUNTER — OFFICE VISIT (OUTPATIENT)
Dept: OBSTETRICS AND GYNECOLOGY | Facility: CLINIC | Age: 52
End: 2022-09-13
Payer: COMMERCIAL

## 2022-09-13 VITALS
WEIGHT: 125 LBS | HEIGHT: 66 IN | DIASTOLIC BLOOD PRESSURE: 68 MMHG | BODY MASS INDEX: 20.09 KG/M2 | SYSTOLIC BLOOD PRESSURE: 112 MMHG

## 2022-09-13 DIAGNOSIS — R93.5 ABNORMAL ULTRASOUND OF ENDOMETRIUM: ICD-10-CM

## 2022-09-13 DIAGNOSIS — N93.9 ABNORMAL UTERINE BLEEDING (AUB): Primary | ICD-10-CM

## 2022-09-13 DIAGNOSIS — N83.209 CYST OF OVARY, UNSPECIFIED LATERALITY: ICD-10-CM

## 2022-09-13 LAB
B-HCG UR QL: NEGATIVE
CTP QC/QA: YES

## 2022-09-13 PROCEDURE — 88305 TISSUE EXAM BY PATHOLOGIST: CPT | Mod: 26,,, | Performed by: PATHOLOGY

## 2022-09-13 PROCEDURE — 99213 PR OFFICE/OUTPT VISIT, EST, LEVL III, 20-29 MIN: ICD-10-PCS | Mod: 25,S$GLB,, | Performed by: OBSTETRICS & GYNECOLOGY

## 2022-09-13 PROCEDURE — 81025 POCT URINE PREGNANCY: ICD-10-PCS | Mod: S$GLB,,, | Performed by: OBSTETRICS & GYNECOLOGY

## 2022-09-13 PROCEDURE — 81025 URINE PREGNANCY TEST: CPT | Mod: S$GLB,,, | Performed by: OBSTETRICS & GYNECOLOGY

## 2022-09-13 PROCEDURE — 88305 TISSUE EXAM BY PATHOLOGIST: CPT | Performed by: PATHOLOGY

## 2022-09-13 PROCEDURE — 1159F PR MEDICATION LIST DOCUMENTED IN MEDICAL RECORD: ICD-10-PCS | Mod: CPTII,S$GLB,, | Performed by: OBSTETRICS & GYNECOLOGY

## 2022-09-13 PROCEDURE — 3074F SYST BP LT 130 MM HG: CPT | Mod: CPTII,S$GLB,, | Performed by: OBSTETRICS & GYNECOLOGY

## 2022-09-13 PROCEDURE — 1159F MED LIST DOCD IN RCRD: CPT | Mod: CPTII,S$GLB,, | Performed by: OBSTETRICS & GYNECOLOGY

## 2022-09-13 PROCEDURE — 99213 OFFICE O/P EST LOW 20 MIN: CPT | Mod: 25,S$GLB,, | Performed by: OBSTETRICS & GYNECOLOGY

## 2022-09-13 PROCEDURE — 58100 BIOPSY OF UTERUS LINING: CPT | Mod: S$GLB,,, | Performed by: OBSTETRICS & GYNECOLOGY

## 2022-09-13 PROCEDURE — 58100 PR BIOPSY OF UTERUS LINING: ICD-10-PCS | Mod: S$GLB,,, | Performed by: OBSTETRICS & GYNECOLOGY

## 2022-09-13 PROCEDURE — 3074F PR MOST RECENT SYSTOLIC BLOOD PRESSURE < 130 MM HG: ICD-10-PCS | Mod: CPTII,S$GLB,, | Performed by: OBSTETRICS & GYNECOLOGY

## 2022-09-13 PROCEDURE — 3008F PR BODY MASS INDEX (BMI) DOCUMENTED: ICD-10-PCS | Mod: CPTII,S$GLB,, | Performed by: OBSTETRICS & GYNECOLOGY

## 2022-09-13 PROCEDURE — 3078F DIAST BP <80 MM HG: CPT | Mod: CPTII,S$GLB,, | Performed by: OBSTETRICS & GYNECOLOGY

## 2022-09-13 PROCEDURE — 3008F BODY MASS INDEX DOCD: CPT | Mod: CPTII,S$GLB,, | Performed by: OBSTETRICS & GYNECOLOGY

## 2022-09-13 PROCEDURE — 88305 TISSUE EXAM BY PATHOLOGIST: ICD-10-PCS | Mod: 26,,, | Performed by: PATHOLOGY

## 2022-09-13 PROCEDURE — 3078F PR MOST RECENT DIASTOLIC BLOOD PRESSURE < 80 MM HG: ICD-10-PCS | Mod: CPTII,S$GLB,, | Performed by: OBSTETRICS & GYNECOLOGY

## 2022-09-13 NOTE — PROGRESS NOTES
"Chief Complaint: Abnormal Uterine Bleeding     Subjective:      Mandi Juan is a 52 y.o.  who presents today for US results review and endometrial biopsy. Notes vaginal bleeding persisted for 6 weeks with heaviest bleeding and clots last week.  Bleeding then spontaneously resolved.        The risks and benefits of endometrial biopsy were reviewed.  All of Ms. Juan's questions were answered. She voiced her understanding and agreed to proceed with endometrial biopsy.  Informed consent was obtained.      Objective:      UPT is negative    Vitals:    22 1009   BP: 112/68   Weight: 56.7 kg (125 lb)   Height: 5' 6" (1.676 m)   PainSc: 0-No pain     GENERAL: Well nourished, well developed, in no acute distress.    Results for orders placed during the hospital encounter of 22    US Pelvis Comp with Transvag NON-OB (xpd    Narrative  EXAMINATION:  US PELVIS COMP WITH TRANSVAG NON-OB (XPD)    CLINICAL HISTORY:  Abnormal uterine and vaginal bleeding, unspecified    TECHNIQUE:  Transabdominal sonography of the pelvis was performed, followed by transvaginal sonography to better evaluate the uterus and ovaries.    COMPARISON:  2022    FINDINGS:  The uterus measures 7.6 cm in length. There is a 5 mm nabothian cyst in the region of the cervix.  Additional possible nabothian cyst within the lower uterine segment measuring approximately 9 mm in size.  The endometrial stripe in this perimenopausal patient measures 7.1 mm which is within normal limits.  Multiple tiny cysts thought present within the endometrium the right ovary measures 1.8 x 0.9 x 1.4 cm.  The left ovary measures 3.6 x 3.8 x 3.9 cm. There is a cyst with a daughter cyst noted within the left ovary that measures 3.4 x 2.8 x 2.8 cm in size.  Vascular flow demonstrated to both ovaries.  No free fluid identified.    Impression  1. Endometrial stripe measuring up to 7 mm along with multiple small cystic spaces noted within the endometrium, " which can be seen with endometrial hyperplasia.  2. 3.4 cm cyst with daughter cyst noted within the left ovary.      Electronically signed by: Eleazar Renteria,   Date:    2022  Time:    16:02      Endometrial Biopsy Procedure:      TIME OUT PERFORMED.     Speculum placed and betadine used to clean the cervix. A single tooth tenaculum was placed on the anterior lip of the cervix. EMB pipelle inserted and the uterus sounded to 8 cm. 2 passes were done. moderate amount of tissue was obtained. Single tooth removed and hemostasis was noted. The speculum was removed.  The patient tolerated the procedure well.    All collected specimens sent to pathology for histologic analysis.      Assessment/Plan:     Abnormal uterine bleeding (AUB)  -     POCT urine pregnancy  -     Specimen to Pathology, Ob/Gyn    Abnormal ultrasound of endometrium  -     POCT urine pregnancy  -     Specimen to Pathology, Ob/Gyn    Cyst of ovary, unspecified laterality  -     US Pelvis Comp with Transvag NON-OB (xpd; Future; Expected date: 2022      Final plan and follow up to be based on biopsy results.    Counselin. The importance of keeping follow-up appointments was discussed.  2. For cramping NSAIDs or Tylenol were recommended.  3. Patient advised that she may resume normal activities, including tampon use and intercourse, as soon as she feels ready.   4. Patient informed that spotting to mild bleeding is to be expected following endometrial biopsy.  5. Patient instructed to call the office for heavy bleeding, significant pain, or a  foul-smelling vaginal discharge.      Use of the [employer] Patient Portal discussed and encouraged during today's visit.           Eleni Alaniz MD  [facility]  2022

## 2022-09-16 LAB
FINAL PATHOLOGIC DIAGNOSIS: NORMAL
GROSS: NORMAL
Lab: NORMAL

## 2022-09-18 ENCOUNTER — PATIENT MESSAGE (OUTPATIENT)
Dept: INTERNAL MEDICINE | Facility: CLINIC | Age: 52
End: 2022-09-18
Payer: COMMERCIAL

## 2022-09-19 RX ORDER — ALPRAZOLAM 0.5 MG/1
0.5 TABLET ORAL DAILY PRN
Qty: 90 TABLET | Refills: 0 | Status: SHIPPED | OUTPATIENT
Start: 2022-09-19 | End: 2022-09-21 | Stop reason: SDUPTHER

## 2022-09-20 ENCOUNTER — PATIENT MESSAGE (OUTPATIENT)
Dept: INTERNAL MEDICINE | Facility: CLINIC | Age: 52
End: 2022-09-20
Payer: COMMERCIAL

## 2022-09-20 DIAGNOSIS — F41.9 ANXIETY: Primary | ICD-10-CM

## 2022-09-20 DIAGNOSIS — R79.89 ABNORMAL LIVER FUNCTION TEST: Primary | ICD-10-CM

## 2022-09-21 RX ORDER — ALPRAZOLAM 0.25 MG/1
0.25 TABLET ORAL DAILY PRN
Qty: 15 TABLET | Refills: 0 | Status: SHIPPED | OUTPATIENT
Start: 2022-09-21 | End: 2023-11-09 | Stop reason: ALTCHOICE

## 2022-09-30 ENCOUNTER — CLINICAL SUPPORT (OUTPATIENT)
Dept: OTHER | Facility: CLINIC | Age: 52
End: 2022-09-30
Payer: COMMERCIAL

## 2022-09-30 DIAGNOSIS — Z00.8 ENCOUNTER FOR OTHER GENERAL EXAMINATION: ICD-10-CM

## 2022-10-14 ENCOUNTER — PATIENT MESSAGE (OUTPATIENT)
Dept: OBSTETRICS AND GYNECOLOGY | Facility: CLINIC | Age: 52
End: 2022-10-14
Payer: COMMERCIAL

## 2022-10-19 ENCOUNTER — OFFICE VISIT (OUTPATIENT)
Dept: OBSTETRICS AND GYNECOLOGY | Facility: CLINIC | Age: 52
End: 2022-10-19
Payer: COMMERCIAL

## 2022-10-19 VITALS
BODY MASS INDEX: 20.16 KG/M2 | WEIGHT: 125.44 LBS | HEIGHT: 66 IN | SYSTOLIC BLOOD PRESSURE: 100 MMHG | DIASTOLIC BLOOD PRESSURE: 80 MMHG

## 2022-10-19 DIAGNOSIS — Z12.31 BREAST CANCER SCREENING BY MAMMOGRAM: ICD-10-CM

## 2022-10-19 DIAGNOSIS — Z80.9 FAMILY HISTORY OF CANCER: ICD-10-CM

## 2022-10-19 DIAGNOSIS — Z01.419 ENCOUNTER FOR ANNUAL ROUTINE GYNECOLOGICAL EXAMINATION: Primary | ICD-10-CM

## 2022-10-19 DIAGNOSIS — R10.2 CHRONIC PELVIC PAIN IN FEMALE: ICD-10-CM

## 2022-10-19 DIAGNOSIS — G89.29 CHRONIC PELVIC PAIN IN FEMALE: ICD-10-CM

## 2022-10-19 DIAGNOSIS — N83.209 CYST OF OVARY, UNSPECIFIED LATERALITY: ICD-10-CM

## 2022-10-19 DIAGNOSIS — N95.1 PERIMENOPAUSE: ICD-10-CM

## 2022-10-19 PROCEDURE — 1160F PR REVIEW ALL MEDS BY PRESCRIBER/CLIN PHARMACIST DOCUMENTED: ICD-10-PCS | Mod: CPTII,S$GLB,, | Performed by: OBSTETRICS & GYNECOLOGY

## 2022-10-19 PROCEDURE — 1160F RVW MEDS BY RX/DR IN RCRD: CPT | Mod: CPTII,S$GLB,, | Performed by: OBSTETRICS & GYNECOLOGY

## 2022-10-19 PROCEDURE — 99396 PR PREVENTIVE VISIT,EST,40-64: ICD-10-PCS | Mod: S$GLB,,, | Performed by: OBSTETRICS & GYNECOLOGY

## 2022-10-19 PROCEDURE — 88141 CYTOPATH C/V INTERPRET: CPT | Mod: ,,, | Performed by: PATHOLOGY

## 2022-10-19 PROCEDURE — 3079F PR MOST RECENT DIASTOLIC BLOOD PRESSURE 80-89 MM HG: ICD-10-PCS | Mod: CPTII,S$GLB,, | Performed by: OBSTETRICS & GYNECOLOGY

## 2022-10-19 PROCEDURE — 88175 CYTOPATH C/V AUTO FLUID REDO: CPT | Performed by: PATHOLOGY

## 2022-10-19 PROCEDURE — 3074F SYST BP LT 130 MM HG: CPT | Mod: CPTII,S$GLB,, | Performed by: OBSTETRICS & GYNECOLOGY

## 2022-10-19 PROCEDURE — 3079F DIAST BP 80-89 MM HG: CPT | Mod: CPTII,S$GLB,, | Performed by: OBSTETRICS & GYNECOLOGY

## 2022-10-19 PROCEDURE — 1159F PR MEDICATION LIST DOCUMENTED IN MEDICAL RECORD: ICD-10-PCS | Mod: CPTII,S$GLB,, | Performed by: OBSTETRICS & GYNECOLOGY

## 2022-10-19 PROCEDURE — 1159F MED LIST DOCD IN RCRD: CPT | Mod: CPTII,S$GLB,, | Performed by: OBSTETRICS & GYNECOLOGY

## 2022-10-19 PROCEDURE — 88141 PR  CYTOPATH CERV/VAG INTERPRET: ICD-10-PCS | Mod: ,,, | Performed by: PATHOLOGY

## 2022-10-19 PROCEDURE — 87624 HPV HI-RISK TYP POOLED RSLT: CPT | Performed by: OBSTETRICS & GYNECOLOGY

## 2022-10-19 PROCEDURE — 99396 PREV VISIT EST AGE 40-64: CPT | Mod: S$GLB,,, | Performed by: OBSTETRICS & GYNECOLOGY

## 2022-10-19 PROCEDURE — 3074F PR MOST RECENT SYSTOLIC BLOOD PRESSURE < 130 MM HG: ICD-10-PCS | Mod: CPTII,S$GLB,, | Performed by: OBSTETRICS & GYNECOLOGY

## 2022-10-19 RX ORDER — NORETHINDRONE ACETATE AND ETHINYL ESTRADIOL 1MG-20(21)
1 KIT ORAL DAILY
Qty: 84 TABLET | Refills: 4 | Status: SHIPPED | OUTPATIENT
Start: 2022-10-19 | End: 2023-09-12 | Stop reason: SDUPTHER

## 2022-10-19 NOTE — PATIENT INSTRUCTIONS
Please check out the [employer] PATIENT WEBSITE.  The site has education materials, patient stories, expert views, and a portal for you to ask questions.       [email or web address]      As always, please let me know if you have any questions.     Dr. Eleni Alaniz

## 2022-10-19 NOTE — PROGRESS NOTES
Chief Complaint: Well Woman Exam     HPI:      Mandi Juan is a 52 y.o.  who presents for annual exam. She is currently complaining of  recent right sided pelvic pain. Notes that pain was sharp and spontaneously resolved. Notes history of chronic pelvic pain, dyspareunia, dyschezia, and rectal pain with orgasm.  She notes that symptoms improved previously with GUMARO.  Interested in restarting.  Denies any vaginal bleeding since last visit .    Ms. Juan is currently sexually active with a single male partner. She declines STD screening today. Patient does not have regular monthly menses. No LMP recorded (lmp unknown). She is currently using vasectomy for contraception.    Previous Pap:  no abnormalities (2019)  Previous Mammogram:   Results for orders placed during the hospital encounter of 11/10/21    Mammo Digital Screening Bilat w/ Chris    Narrative  Result:  Mammo Digital Screening Bilat w/ Chris    History:  Patient is 51 y.o. and is seen for a screening mammogram.    Films Compared:  Compared to: 10/20/2020 Mammo Digital Diagnostic Left with Chris, 2020 Mammo Digital Screening Bilat w/ Chris, and 2019 Mammo Digital Screening Bilat w/ Chris    Findings:  This procedure was performed using tomosynthesis. Computer-aided detection was utilized in the interpretation of this examination.  The breasts are heterogeneously dense, which may obscure small masses.    Left  There is an asymmetry seen in the lower region of the left breast in the posterior depth on the MLO view.    Right  There is no evidence of suspicious masses, calcifications, or other abnormal findings in the right breast.    Impression  Left  Asymmetry: Left breast asymmetry at the lower posterior position. Assessment: 0 - Incomplete. Diagnostic Mammogram and/or Ultrasound is recommended.    Right  There is no mammographic evidence of malignancy in the right breast.    BI-RADS Category:  Overall: 0 - Incomplete: Needs Additional  Imaging Evaluation    Recommendation:  Diagnostic mammogram with possible ultrasound (if indicated) is recommended.      Your estimated lifetime risk of breast cancer (to age 85) based on Tyrer-Cuzick risk assessment model is Tyrer-Cuzick: 10.98 %. According to the American Cancer Society, patients with a lifetime breast cancer risk of 20% or higher might benefit from supplemental screening tests.     Most Recent Dexa: not indicated  Colonoscopy: 2019 - repeat 10 years    COVID vaccine: completed series + booster  Gardasil:Has never had     Patient Active Problem List   Diagnosis    GERD (gastroesophageal reflux disease)    Migraines    Irritable bowel syndrome with constipation    Anxiety    Meningocele spinal    Palpitations    Mixed stress and urge urinary incontinence    Multiple joint pain    Fatigue       Past Medical History:   Diagnosis Date    Anxiety     COVID-19     GERD (gastroesophageal reflux disease)     Internal hemorrhoids     Irritable bowel syndrome     Migraines     Palpitations     UTI (urinary tract infection)        Past Surgical History:   Procedure Laterality Date     SECTION      COLONOSCOPY      hemorrhoid banding  2022    TONSILLECTOMY         OB History          2    Para   2    Term   2            AB        Living   3         SAB        IAB        Ectopic        Multiple   1    Live Births   2           Obstetric Comments   14/R/4  No abnormal pap  No STDs   x 1, CD x 1  (TIUP 37 weeks)               ROS:     Review of Systems   Constitutional:  Negative for activity change, fatigue and unexpected weight change.   Respiratory:  Negative for shortness of breath.    Cardiovascular:  Negative for chest pain.   Gastrointestinal:  Negative for abdominal pain, blood in stool, constipation and diarrhea.   Endocrine: Negative for cold intolerance and heat intolerance.   Genitourinary:  Positive for dyspareunia, menstrual irregularity and pelvic pain. Negative for  "bladder incontinence, dysuria, frequency, hot flashes, vaginal bleeding and vaginal dryness.   Integumentary:  Negative for breast mass, breast discharge and breast tenderness.   Psychiatric/Behavioral:  Negative for dysphoric mood. The patient is not nervous/anxious.    Breast: Negative for mass and tenderness    Physical Exam:      PHYSICAL EXAM:  /80   Ht 5' 6" (1.676 m)   Wt 56.9 kg (125 lb 7.1 oz)   LMP  (LMP Unknown)   BMI 20.25 kg/m²   Body mass index is 20.25 kg/m².     APPEARANCE: Well nourished, well developed, in no acute distress.  PSYCH: Appropriate mood and affect.  SKIN: No acne or hirsutism  NECK: Neck symmetric without masses or thyromegaly  NODES: No inguinal, axillary, or supraclavicular lymph node enlargement  CHEST: Normal respiratory effort.  ABDOMEN: Soft.  No tenderness or masses.   BREASTS: Symmetrical, no skin changes or visible lesions.  No palpable masses or nipple discharge bilaterally.  PELVIC: Normal external genitalia without lesions.  Normal hair distribution.  Adequate perineal body, normal urethral meatus.  Vagina moist and well rugated without lesions or discharge.  Cervix pink, without lesions, discharge or tenderness.  No significant cystocele or rectocele.  Bimanual exam shows uterus to be normal size, regular, mobile and nontender.  Adnexa without masses or tenderness.    EXTREMITIES: No edema.    Lab Results   Component Value Date    WBC 7.77 08/25/2022    HGB 14.4 08/25/2022    HCT 43.7 08/25/2022    MCV 96 08/25/2022     08/25/2022       Lab Results   Component Value Date    TSH 1.370 09/12/2022         Component 1 mo ago    Final Pathologic Diagnosis FRAGMENTS OF WEAKLY PROLIFERATIVE ENDOMETRIUM ADMIXED WITH BLOOD CLOT    Comment: Interp By lAin Lafleur M.D., Signed on 09/16/2022 at 08:32       Results for orders placed during the hospital encounter of 10/10/22    US Pelvis Comp with Transvag NON-OB (xpd    Narrative  EXAMINATION:  US PELVIS COMP WITH " "TRANSVAG NON-OB (XPD)    CLINICAL HISTORY:  Unspecified ovarian cyst, unspecified side    TECHNIQUE:  Multiple grayscale and color Doppler images of the pelvis were obtained utilizing the transabdominal and transvaginal technique.  Spectral Doppler evaluation of both ovaries was performed.    COMPARISON:  Pelvic sonogram from 08/25/2022, 02/08/2022 and 11/23/2020    FINDINGS:  Uterus:    Anteverted measuring 9.0 x 3.9 x 6.4 cm.    Minimally heterogeneous endometrial stripe measuring 4 mm in thickness.  Cystic areas seen on 08/25/2022 are slightly decreased in size compared to the prior.    A subcentimeter nabothian cyst is present within the cervix.    Uterine parenchyma appears normal.    Right ovary:    Measures 2.2 x 1.2 x 1.5 cm.    Normal sonographic appearance.    Spectral Doppler evaluation demonstrates normal arteriovenous waveforms.    Left ovary:    Measures 4.5 by 3.6 x 4.4 cm (only seen on transabdominal scan)    Septated left ovarian cyst measures 4.0 x 2.9 x 4.5 cm (previously measuring 3.4 x 2.8 x 2.8 cm).  Size of the previously seen "daughter cyst" has increased.  Fluid is anechoic.  Single thin septation is present.  Color Doppler evaluation demonstrates no true internal vascularity.    Other:    No free fluid in the cul-de-sac.    Impression  1. Decreased thickness of the endometrial stripe, now measuring 4 mm.  Previously described cystic changes have partially resolved.  2. Interval increase in size in septated left ovarian cyst, now measuring up to 4.5 cm, previously measuring 3.4 cm in longest dimension.      Electronically signed by: Narinder Brown MD  Date:    10/10/2022  Time:    09:23      Assessment:     1. Encounter for annual routine gynecological examination  Liquid-Based Pap Smear, Screening    HPV High Risk Genotypes, PCR      2. Breast cancer screening by mammogram  Mammo Digital Screening Bilat w/ Chris      3. Chronic pelvic pain in female        4. Cyst of ovary, unspecified " laterality  CANCER ANTIGEN 125    US Pelvis Comp with Transvag NON-OB (xpd      5. Perimenopause  norethindrone-ethinyl estradiol (JUNEL FE 1/20) 1 mg-20 mcg (21)/75 mg (7) per tablet      6. Family history of cancer              Plan:     Clinical breast exam performed.  Pap collected.  Mammogram ordered.  DEXA at 64 or as needed.  Colonoscopy UTD, repeat 2029.  Contraception: vasectomy.  Perimenopause: discussed expectations for perimenopausal menses, reviewed risks and benefits of treatment with hormonal contraception, patient desires to restart GUMARO.  Junel Fe 1/20 sent to pharmacy.  Ovarian cyst:  ordered, repeat US in 6-8 weeks  Follow up in about 8 weeks (around 12/14/2022) for results.    Counseling:     Patient was counseled today on current ASCCP pap guidelines, the recommendation for yearly pelvic exams, healthy diet and exercise routines, breast self awareness and annual mammograms. She is to see her PCP for other health maintenance.       Use of the [email or web address] Patient Portal discussed and encouraged during today's visit.         Eleni Alaniz MD  [facility]  10/19/2022

## 2022-10-20 ENCOUNTER — TELEPHONE (OUTPATIENT)
Dept: GYNECOLOGIC ONCOLOGY | Facility: CLINIC | Age: 52
End: 2022-10-20
Payer: COMMERCIAL

## 2022-10-20 NOTE — TELEPHONE ENCOUNTER
Spoke with our patient about her insurance, schedule appointment she voiced understanding of the date, time and location. All questions answered appointment mail. Provider Scheduling Coord.  Gynecologic Oncology MA/PAR /Preceptor [place] Harris

## 2022-10-20 NOTE — TELEPHONE ENCOUNTER
----- Message from Pallavi Flaherty RN sent at 10/19/2022  3:00 PM CDT -----  TE  please.    Pallavi  ----- Message -----  From: Eleni Alaniz MD  Sent: 10/19/2022   9:53 AM CDT  To: Pallavi Flaherty RN

## 2022-10-24 LAB
GLUCOSE SERPL-MCNC: 94 MG/DL (ref 60–140)
HDLC SERPL-MCNC: 89 MG/DL
POC CHOLESTEROL, LDL (DOCK): 70 MG/DL
POC CHOLESTEROL, TOTAL: 170 MG/DL
TRIGL SERPL-MCNC: 55 MG/DL

## 2022-10-25 LAB
HPV HR 12 DNA SPEC QL NAA+PROBE: NEGATIVE
HPV16 AG SPEC QL: NEGATIVE
HPV18 DNA SPEC QL NAA+PROBE: NEGATIVE

## 2022-10-26 LAB
FINAL PATHOLOGIC DIAGNOSIS: NORMAL
Lab: NORMAL

## 2022-10-29 VITALS
DIASTOLIC BLOOD PRESSURE: 76 MMHG | HEIGHT: 66 IN | SYSTOLIC BLOOD PRESSURE: 116 MMHG | WEIGHT: 121.38 LBS | BODY MASS INDEX: 19.51 KG/M2

## 2022-11-08 ENCOUNTER — CLINICAL SUPPORT (OUTPATIENT)
Dept: GYNECOLOGIC ONCOLOGY | Facility: CLINIC | Age: 52
End: 2022-11-08
Payer: COMMERCIAL

## 2022-11-08 ENCOUNTER — LAB VISIT (OUTPATIENT)
Dept: LAB | Facility: OTHER | Age: 52
End: 2022-11-08
Attending: INTERNAL MEDICINE
Payer: COMMERCIAL

## 2022-11-08 DIAGNOSIS — Z80.3 FAMILY HISTORY OF BREAST CANCER: ICD-10-CM

## 2022-11-08 DIAGNOSIS — Z80.3 FAMILY HISTORY OF BREAST CANCER: Primary | ICD-10-CM

## 2022-11-08 PROCEDURE — 36415 COLL VENOUS BLD VENIPUNCTURE: CPT | Performed by: OBSTETRICS & GYNECOLOGY

## 2022-11-08 NOTE — PROGRESS NOTES
Pt arrived unaccompanied, for tele-genetics appointment. Pt watched [employer] Education Tool Video necessary to obtain informed consent for genetic testing. Pt agreed to proceed with genetic counseling via telephone. Patient spoke to [employer] genetic counselor, personal/family history obtained. Pt provided verbal consent to proceed with genetic testing. Lab requisition, demographic information, insurance card and family pedigree/history report all printed, packaged in test kit and given to pt for lab appt. Pt expressed verbal understanding that our dept would reach out to her when results are received, approximately 2-4 weeks after insurance approval is obtained by [employer].

## 2022-11-22 LAB — INTEGRATED BRAC ANALYSIS: NORMAL

## 2022-11-23 ENCOUNTER — TELEPHONE (OUTPATIENT)
Dept: GYNECOLOGIC ONCOLOGY | Facility: CLINIC | Age: 52
End: 2022-11-23
Payer: COMMERCIAL

## 2022-11-23 NOTE — TELEPHONE ENCOUNTER
Informed patient no clinically significant mutations identified through genetic testing.  I discussed the importance of maintaining an open line of communication with her established providers, for all future concerning symptoms. We also discussed the need to continue any screening measures and routine self/provider exams (colonoscopies, skin checks,self-breast exams, mammograms etc), despite negative results. Pt educated on need to have routine screening colonoscopies. Pt states she does this at recommended intervals with Dr. Warren (GI provider at [facility]). Results mailed to address on file after patient agreed this was ok and confirmed address. The patient was informed of [employer] number on last page for additional questions regarding results or to participate in post-test genetic counseling, once results received via mail. Pt expressed verbal understanding to all information provided. Message sent to  regarding patient results.

## 2022-12-13 ENCOUNTER — OFFICE VISIT (OUTPATIENT)
Dept: OBSTETRICS AND GYNECOLOGY | Facility: CLINIC | Age: 52
End: 2022-12-13
Payer: COMMERCIAL

## 2022-12-13 VITALS
WEIGHT: 126.13 LBS | HEIGHT: 66 IN | SYSTOLIC BLOOD PRESSURE: 110 MMHG | DIASTOLIC BLOOD PRESSURE: 70 MMHG | BODY MASS INDEX: 20.27 KG/M2

## 2022-12-13 DIAGNOSIS — R10.2 PELVIC PAIN: Primary | ICD-10-CM

## 2022-12-13 DIAGNOSIS — N83.202 CYST OF LEFT OVARY: ICD-10-CM

## 2022-12-13 DIAGNOSIS — N93.9 ABNORMAL UTERINE BLEEDING (AUB): ICD-10-CM

## 2022-12-13 PROCEDURE — 3074F PR MOST RECENT SYSTOLIC BLOOD PRESSURE < 130 MM HG: ICD-10-PCS | Mod: CPTII,S$GLB,, | Performed by: OBSTETRICS & GYNECOLOGY

## 2022-12-13 PROCEDURE — 3008F PR BODY MASS INDEX (BMI) DOCUMENTED: ICD-10-PCS | Mod: CPTII,S$GLB,, | Performed by: OBSTETRICS & GYNECOLOGY

## 2022-12-13 PROCEDURE — 3074F SYST BP LT 130 MM HG: CPT | Mod: CPTII,S$GLB,, | Performed by: OBSTETRICS & GYNECOLOGY

## 2022-12-13 PROCEDURE — 1159F PR MEDICATION LIST DOCUMENTED IN MEDICAL RECORD: ICD-10-PCS | Mod: CPTII,S$GLB,, | Performed by: OBSTETRICS & GYNECOLOGY

## 2022-12-13 PROCEDURE — 99214 OFFICE O/P EST MOD 30 MIN: CPT | Mod: S$GLB,,, | Performed by: OBSTETRICS & GYNECOLOGY

## 2022-12-13 PROCEDURE — 3078F DIAST BP <80 MM HG: CPT | Mod: CPTII,S$GLB,, | Performed by: OBSTETRICS & GYNECOLOGY

## 2022-12-13 PROCEDURE — 99214 PR OFFICE/OUTPT VISIT, EST, LEVL IV, 30-39 MIN: ICD-10-PCS | Mod: S$GLB,,, | Performed by: OBSTETRICS & GYNECOLOGY

## 2022-12-13 PROCEDURE — 3078F PR MOST RECENT DIASTOLIC BLOOD PRESSURE < 80 MM HG: ICD-10-PCS | Mod: CPTII,S$GLB,, | Performed by: OBSTETRICS & GYNECOLOGY

## 2022-12-13 PROCEDURE — 1159F MED LIST DOCD IN RCRD: CPT | Mod: CPTII,S$GLB,, | Performed by: OBSTETRICS & GYNECOLOGY

## 2022-12-13 PROCEDURE — 3008F BODY MASS INDEX DOCD: CPT | Mod: CPTII,S$GLB,, | Performed by: OBSTETRICS & GYNECOLOGY

## 2022-12-13 NOTE — PROGRESS NOTES
Chief Complaint: U/S Results     HPI:      Mandi Juan is a 52 y.o.  with CPP and AUB who presents today for follow up of U/S results.  LMP: Patient's last menstrual period was 12/10/2022 (exact date)..  Notes withdrawal bleeding during first pill pack was heavy and with clots.  Most recent menses has been lighter and associated with moderate bloating and cramping.  Continues to report CPP, dyspareunia, and dyschezia.  Reports no increased in symptoms. No other issues, problems, or complaints.     REVIEW:   2022: Seen with complaints of prolonged bleeding x 6 weeks    US 2022:     Impression  1. Endometrial stripe measuring up to 7 mm along with multiple small cystic spaces noted within the endometrium, which can be seen with endometrial hyperplasia.  2. 3.4 cm cyst with daughter cyst noted within the left ovary.  EMBx 2022:   Component 3 mo ago    Final Pathologic Diagnosis FRAGMENTS OF WEAKLY PROLIFERATIVE ENDOMETRIUM ADMIXED WITH BLOOD CLOT    Comment: Interp By Alin Lafleur M.D., Signed on 2022 at 08:32   10/19/2022: Restarted on GUMARO, pap collected, repeat US ordered to follow ovarian cyst, Ca125 WNL      PAP 10/2022  Component 1 mo ago    Final Pathologic Diagnosis Specimen Adequacy   Satisfactory for interpretation. Endocervical component is present.   [place] Category   Negative for intraepithelial lesion or malignancy.   Endometrial cells present in a woman greater than or equal to 45 years of age.    Comment: Interp By Missy Earl MD, Signed on 10/26/2022 at 15:40      Pelvic US 2022  EXAMINATION:  US PELVIS COMP WITH TRANSVAG NON-OB (XPD)     CLINICAL HISTORY:  Unspecified ovarian cyst, unspecified side     TECHNIQUE:  Transabdominal sonography of the pelvis was performed, followed by transvaginal sonography to better evaluate the uterus and ovaries.     COMPARISON:  10/10/2022     FINDINGS:  The uterus measures 9.5 cm in length. Incidental note made of a 5 mm  myometrial cyst near the fundus.  This is nonspecific but could be seen with adenomyosis.  The endometrial stripe in this premenopausal patient measures 2.8 mm which is within normal limits.  The right ovary measures 2.2 x 1.0 x 1.7 cm.  The left ovary measures 4.2 x 2.4 x 3.7 cm. There is a 3.7 x 1.7 x 2.1 cm cystic lesion within the left ovary that demonstrates thin internal reticulations.  The appearance is different than the prior study and is most consistent with a functional hemorrhagic cyst.  Vascular flow demonstrated to both ovaries.  No free fluid identified.     Impression:     1. Small 5 mm myometrial cyst noted near the fundus of the uterus not seen on the prior.  This is nonspecific but could be seen with adenomyosis.  2. Complex left ovarian cyst noted.  This cyst is different in appearance and shape when compared to the prior cyst and is most consistent with a functional hemorrhagic cyst.        Electronically signed by: Eleazar Renteria DO  Date:                                            2022  Time:                                           08:38           Exam Ended: 22 08:32 Last Resulted: 22 08:38             OB History          2    Para   2    Term   2            AB        Living   3         SAB        IAB        Ectopic        Multiple   1    Live Births   2           Obstetric Comments   14/R/4  No abnormal pap  No STDs   x 1, CD x 1  (TIUP 37 weeks)             Past Medical History:   Diagnosis Date    Anxiety     COVID-19     GERD (gastroesophageal reflux disease)     Internal hemorrhoids     Irritable bowel syndrome     Migraines     Palpitations     UTI (urinary tract infection)      Past Surgical History:   Procedure Laterality Date     SECTION      COLONOSCOPY      hemorrhoid banding  2022    TONSILLECTOMY       Social History     Socioeconomic History    Marital status:     Number of children: 3   Tobacco Use    Smoking status:  Never    Smokeless tobacco: Never   Substance and Sexual Activity    Alcohol use: Yes     Comment: Seldomly drink    Drug use: No    Sexual activity: Yes     Partners: Male     Birth control/protection: Partner-Vasectomy   Social History Narrative    She and her  live in [place]. No kids at home. She had twins that were boy-girl. Her son is in physical therapy [facility]. Her daughter is starting medical school at hospitals. Her oldest is a speech therapist. They do not have pets.      Social Determinants of Health     Financial Resource Strain: Low Risk     Difficulty of Paying Living Expenses: Not hard at all   Food Insecurity: No Food Insecurity    Worried About Running Out of Food in the Last Year: Never true    Ran Out of Food in the Last Year: Never true   Transportation Needs: No Transportation Needs    Lack of Transportation (Medical): No    Lack of Transportation (Non-Medical): No   Physical Activity: Inactive    Days of Exercise per Week: 0 days    Minutes of Exercise per Session: 10 min   Stress: No Stress Concern Present    Feeling of Stress : Only a little   Social Connections: Unknown    Frequency of Communication with Friends and Family: More than three times a week    Frequency of Social Gatherings with Friends and Family: Twice a week    Active Member of Clubs or Organizations: Yes    Attends Club or Organization Meetings: 1 to 4 times per year    Marital Status:    Housing Stability: Low Risk     Unable to Pay for Housing in the Last Year: No    Number of Places Lived in the Last Year: 2    Unstable Housing in the Last Year: No     Family History   Problem Relation Age of Onset    Osteoporosis Mother     Arthritis Father         rheumatoid arthrit    No Known Problems Brother     No Known Problems Brother     Colon cancer Maternal Grandmother 58    Diabetes Paternal Grandmother     Heart disease Daughter     No Known Problems Daughter     No Known Problems Son     Arthritis Paternal Aunt      "Breast cancer Paternal Aunt 50        recurrence in 60s    Arthritis Paternal Aunt     Uterine cancer Paternal Aunt 55       Current Outpatient Medications:     famotidine (PEPCID) 40 MG tablet, Take 40 mg by mouth nightly as needed. , Disp: , Rfl:     flecainide (TAMBOCOR) 50 MG Tab, Take 50 mg by mouth every 12 (twelve) hours., Disp: , Rfl:     hyoscyamine (ANASPAZ,LEVSIN) 0.125 mg Tab, Take 125 mcg by mouth every 4 (four) hours as needed., Disp: , Rfl:     norethindrone-ethinyl estradiol (JUNEL FE 1/20) 1 mg-20 mcg (21)/75 mg (7) per tablet, Take 1 tablet by mouth once daily., Disp: 84 tablet, Rfl: 4    plecanatide (TRULANCE) 3 mg Tab, Take 3 mg by mouth once daily., Disp: , Rfl:     verapamil (CALAN-SR) 180 MG CR tablet, TAKE 1/2 TABLET ONCE A DAY WITH FOOD (Patient taking differently: Take 180 mg by mouth once daily.), Disp: 15 tablet, Rfl: 3    ALPRAZolam (XANAX) 0.25 MG tablet, Take 1 tablet (0.25 mg total) by mouth daily as needed for Anxiety. (Patient not taking: Reported on 10/19/2022), Disp: 15 tablet, Rfl: 0    prochlorperazine (COMPAZINE) 10 MG tablet, Take 1 tablet (10 mg total) by mouth 2 (two) times daily as needed (nausea with headache). (Patient not taking: Reported on 10/19/2022), Disp: 20 tablet, Rfl: 5    ROS:     GENERAL: Denies unintentional weight gain or weight loss. Feeling well overall.   SKIN: Denies rash or lesions.   HEENT: Denies headaches, or vision changes.   ABDOMEN: Denies abdominal pain, constipation, diarrhea, nausea, vomiting, change in appetite.  URINARY: Denies frequency, dysuria, hematuria.  NEUROLOGIC: Denies syncope or weakness.   PSYCHIATRIC: Denies depression, anxiety or mood swings.    Physical Exam:      PHYSICAL EXAM:  /70   Ht 5' 6" (1.676 m)   Wt 57.2 kg (126 lb 1.7 oz)   LMP 12/10/2022 (Exact Date)   BMI 20.35 kg/m²   Body mass index is 20.35 kg/m².     APPEARANCE: Well nourished, well developed, in no acute distress.  PSYCH: Appropriate mood and " affect.  SKIN: No acne or hirsutism.    Mammo Digital Screening Bilat w/ Chris  Narrative: Result:  Mammo Digital Screening Bilat w/ Chris    History:  Patient is 52 y.o. and is seen for a screening mammogram.    Films Compared:  Compared to: 2021 Mammo Digital Diagnostic Left with Chris,   11/10/2021 Mammo Digital Screening Bilat w/ Chris, 10/20/2020 Mammo Digital   Diagnostic Left with Chris, 2020 Mammo Digital Screening Bilat w/   Crhis, and 2019 Mammo Digital Screening Bilat w/ Chris     Findings:   This procedure was performed using tomosynthesis.   Computer-aided detection was utilized in the interpretation of this   examination.    The breasts are heterogeneously dense, which may obscure small masses.   There is no evidence of suspicious masses, microcalcifications or   architectural distortion.  Impression:    No mammographic evidence of malignancy.    BI-RADS Category 1: Negative    Recommendation:  Routine screening mammogram in 1 year is recommended.    Your estimated lifetime risk of breast cancer (to age 85) based on   Tyrer-Cuzick risk assessment model is 8.89 %.  According to the American   Cancer Society, patients with a lifetime breast cancer risk of 20% or   higher might benefit from supplemental screening tests. ??       Assessment/Plan:     52 y.o.     Pelvic pain    Abnormal uterine bleeding (AUB)    Cyst of left ovary          Counselin.  AUB, ovarian cyst:  U/S results reviewed with patient.  Prior LOV cyst resolved.   normal.  New functional cyst present.  No need for further workup.  Some findings suggestive of possible adenomyosis.  Recommend continued GUMARO use for control of AUB.  OK to take continuously with withdrawal every 3 months. Reviewed pap results with patient.  Given continued menses and negative EMBx, no need for further workup at this time.   2.  Follow up with PCP for other health maintenance.  3.  RTC 3 months for follow up or sooner if  needed.    Use of the [facility] Patient Portal discussed and encouraged during today's visit.       I spent a total of 30 minutes on the day of the visit.  This includes face to face time and non-face to face time preparing to see the patient (eg, review of tests), obtaining and/or reviewing separately obtained history, documenting clinical information in the electronic or other health record, independently interpreting results and communicating results to the patient/family/caregiver, or care coordinator.        Eleni Alaniz MD  [facility]  12/13/2022

## 2023-03-08 ENCOUNTER — OFFICE VISIT (OUTPATIENT)
Dept: OBSTETRICS AND GYNECOLOGY | Facility: CLINIC | Age: 53
End: 2023-03-08
Payer: COMMERCIAL

## 2023-03-08 VITALS
BODY MASS INDEX: 21.02 KG/M2 | SYSTOLIC BLOOD PRESSURE: 108 MMHG | DIASTOLIC BLOOD PRESSURE: 66 MMHG | HEIGHT: 66 IN | WEIGHT: 130.81 LBS

## 2023-03-08 DIAGNOSIS — N93.9 ABNORMAL UTERINE BLEEDING (AUB): ICD-10-CM

## 2023-03-08 DIAGNOSIS — R10.2 PELVIC PAIN: Primary | ICD-10-CM

## 2023-03-08 PROCEDURE — 3078F DIAST BP <80 MM HG: CPT | Mod: CPTII,S$GLB,, | Performed by: OBSTETRICS & GYNECOLOGY

## 2023-03-08 PROCEDURE — 3074F PR MOST RECENT SYSTOLIC BLOOD PRESSURE < 130 MM HG: ICD-10-PCS | Mod: CPTII,S$GLB,, | Performed by: OBSTETRICS & GYNECOLOGY

## 2023-03-08 PROCEDURE — 1159F MED LIST DOCD IN RCRD: CPT | Mod: CPTII,S$GLB,, | Performed by: OBSTETRICS & GYNECOLOGY

## 2023-03-08 PROCEDURE — 3008F PR BODY MASS INDEX (BMI) DOCUMENTED: ICD-10-PCS | Mod: CPTII,S$GLB,, | Performed by: OBSTETRICS & GYNECOLOGY

## 2023-03-08 PROCEDURE — 3074F SYST BP LT 130 MM HG: CPT | Mod: CPTII,S$GLB,, | Performed by: OBSTETRICS & GYNECOLOGY

## 2023-03-08 PROCEDURE — 1159F PR MEDICATION LIST DOCUMENTED IN MEDICAL RECORD: ICD-10-PCS | Mod: CPTII,S$GLB,, | Performed by: OBSTETRICS & GYNECOLOGY

## 2023-03-08 PROCEDURE — 99212 PR OFFICE/OUTPT VISIT, EST, LEVL II, 10-19 MIN: ICD-10-PCS | Mod: S$GLB,,, | Performed by: OBSTETRICS & GYNECOLOGY

## 2023-03-08 PROCEDURE — 3078F PR MOST RECENT DIASTOLIC BLOOD PRESSURE < 80 MM HG: ICD-10-PCS | Mod: CPTII,S$GLB,, | Performed by: OBSTETRICS & GYNECOLOGY

## 2023-03-08 PROCEDURE — 99212 OFFICE O/P EST SF 10 MIN: CPT | Mod: S$GLB,,, | Performed by: OBSTETRICS & GYNECOLOGY

## 2023-03-08 PROCEDURE — 3008F BODY MASS INDEX DOCD: CPT | Mod: CPTII,S$GLB,, | Performed by: OBSTETRICS & GYNECOLOGY

## 2023-03-08 NOTE — PROGRESS NOTES
"Subjective:       Patient ID: Mandi Juan is a 52 y.o. female.    Chief Complaint:  Follow-up (Cysts)      History of Present Illness  51 yo  presents to follow up for medication management.  Notes that pelvic pain improved with continuous GUMARO but reports significant vasomotor symptoms and right lower quadrant pain during placebo week at the end of third month.  Also reports heavier menses than usual.     Patient Active Problem List   Diagnosis    GERD (gastroesophageal reflux disease)    Migraines    Irritable bowel syndrome with constipation    Anxiety    Meningocele spinal    Palpitations    Mixed stress and urge urinary incontinence    Multiple joint pain    Fatigue       Past Medical History:   Diagnosis Date    Anxiety     COVID-19     GERD (gastroesophageal reflux disease)     Internal hemorrhoids     Irritable bowel syndrome     Migraines     Palpitations     UTI (urinary tract infection)        Past Surgical History:   Procedure Laterality Date     SECTION      COLONOSCOPY      hemorrhoid banding  2022    TONSILLECTOMY         OB History    Para Term  AB Living   2 2 2     3   SAB IAB Ectopic Multiple Live Births         1 2      # Outcome Date GA Lbr Heron/2nd Weight Sex Delivery Anes PTL Lv   2A Term      CS-Unspec      2B Term            1 Term      Vag-Spont         Obstetric Comments   /   No abnormal pap   No STDs    x 1, CD x 1  (TIUP 37 weeks)       Patient's last menstrual period was 2023.   Date of Last Pap: 10/26/2022    R  Objective:   /66   Ht 5' 6" (1.676 m)   Wt 59.4 kg (130 lb 13.5 oz)   LMP 2023   BMI 21.12 kg/m²   Body mass index is 21.12 kg/m².    APPEARANCE: Well nourished, well developed, in no acute distress.  PSYCH: Appropriate mood and affect.  SKIN: No acne or hirsutism  NECK: Neck symmetric without masses or thyromegaly  NODES: No inguinal, axillary, or supraclavicular lymph node enlargement  ABDOMEN: Soft.  No " tenderness or masses.    CARDIOVASCULAR: No edema of peripheral extremities    Results for orders placed during the hospital encounter of 11/30/22    US Pelvis Comp with Transvag NON-OB (xpd    Narrative  EXAMINATION:  US PELVIS COMP WITH TRANSVAG NON-OB (XPD)    CLINICAL HISTORY:  Unspecified ovarian cyst, unspecified side    TECHNIQUE:  Transabdominal sonography of the pelvis was performed, followed by transvaginal sonography to better evaluate the uterus and ovaries.    COMPARISON:  10/10/2022    FINDINGS:  The uterus measures 9.5 cm in length. Incidental note made of a 5 mm myometrial cyst near the fundus.  This is nonspecific but could be seen with adenomyosis.  The endometrial stripe in this premenopausal patient measures 2.8 mm which is within normal limits.  The right ovary measures 2.2 x 1.0 x 1.7 cm.  The left ovary measures 4.2 x 2.4 x 3.7 cm. There is a 3.7 x 1.7 x 2.1 cm cystic lesion within the left ovary that demonstrates thin internal reticulations.  The appearance is different than the prior study and is most consistent with a functional hemorrhagic cyst.  Vascular flow demonstrated to both ovaries.  No free fluid identified.    Impression  1. Small 5 mm myometrial cyst noted near the fundus of the uterus not seen on the prior.  This is nonspecific but could be seen with adenomyosis.  2. Complex left ovarian cyst noted.  This cyst is different in appearance and shape when compared to the prior cyst and is most consistent with a functional hemorrhagic cyst.      Electronically signed by: Eleazar Renteria DO  Date:    11/30/2022  Time:    08:38      Results for orders placed or performed in visit on 11/30/22   HEPATIC FUNCTION PANEL   Result Value Ref Range    Total Protein 7.3 6.0 - 8.4 g/dL    Albumin 4.4 3.5 - 5.2 g/dL    Total Bilirubin 0.9 0.1 - 1.0 mg/dL    AST 42 15 - 46 U/L    ALT 46 (H) 10 - 44 U/L    Alkaline Phosphatase 76 38 - 126 U/L    Bilirubin, Direct 0.1 0.0 - 0.3 mg/dL   CANCER ANTIGEN  125   Result Value Ref Range     11 0 - 30 U/mL       Assessment/ Plan:     1. Pelvic pain  US Pelvis Comp with Transvag NON-OB (xpd      2. Abnormal uterine bleeding (AUB)  US Pelvis Comp with Transvag NON-OB (xpd        Repeat pelvic for RLQ pain and to follow up on endometrium.      Plan to resume continuous GUMARO without withdrawal bleed every 3 months.     Follow up if symptoms worsen or fail to improve.            Eleni Alaniz MD  [facility]  03/08/2023

## 2023-07-24 NOTE — PROGRESS NOTES
[facility] Note    Chief Complaint      Chief Complaint   Patient presents with    Establish Care     pt once saw Dr. Boston ,right lower abd pain, pt states that she feels it the most when urinating and after.     History of Present Illness      Mandi Juan is a 50 y.o. female who presents today for chief complaint rlq pain x1 week. Patient is new to me.  Former PCP Ludy  PCP: Marie Olivas MD  Patient comes to appointment alone.     HPI   Hx of recurrent abd pain bc of redundat colon, sees Dr Jacques GI  Was having new rlq pain, squeezing, worse with urination radiating to groin, no fever, nausea, intermittent, always with urination and intermittent in between,   Tilted uterus from c section   meningocoele at the end of spine, has rectal pain   Sees gyn dr theodore recently off ocp had a lot of ovulatory pain,  had a vasectomy does nto think she could be pregnant   No, no fever, no back pain, no n/v, no bowel habit changes - has chronic diarrhea that is intentionally medication induced, no blood in urine or stool   Does not feel like a uti   Does maybe have hx of left sided kidney stone   Has had cscope in last year    Answers for HPI/ROS submitted by the patient on 11/22/2020   Abdominal pain  Chronicity: new  Onset: in the past 7 days  Onset quality: gradual  Frequency: 2 to 4 times per day  Episode duration: 1 weeks  Progression since onset: waxing and waning  Pain location: RLQ  Pain - numeric: 5/10  Pain quality: cramping  anorexia: No  arthralgias: Yes  belching: No  constipation: No  diarrhea: Yes  dysuria: No  fever: No  flatus: No  frequency: No  headaches: No  hematochezia: No  hematuria: No  melena: No  myalgias: Yes  nausea: No  weight loss: No  vomiting: No  Aggravated by: urination  Relieved by: certain positions  Pain severity: moderate  Treatments tried: nothing  abdominal surgery: No  colon cancer: No  Crohn's disease: No  gallstones: No  GERD:  Yes  irritable bowel syndrome: Yes  Active Problem List with Overview Notes    Diagnosis Date Noted    Right lower quadrant pain 2020    Meningocele spinal 2020    Palpitations 2020    Radicular pain 2019    Piriformis syndrome of left side 2019    Subacute vaginitis 2018    Dysuria 2016    GERD (gastroesophageal reflux disease)     Migraines     Irritable bowel syndrome with constipation     Anxiety        Health Maintenance   Topic Date Due    Hepatitis C Screening  1970    TETANUS VACCINE  1987    Mammogram  10/20/2022    Pap Smear with HPV Cotest  2024    Lipid Panel  2025       Past Medical History:   Diagnosis Date    Anxiety     GERD (gastroesophageal reflux disease)     Irritable bowel syndrome     Migraines     Palpitations     UTI (urinary tract infection)        Past Surgical History:   Procedure Laterality Date     SECTION      COLONOSCOPY      TONSILLECTOMY         family history includes Arthritis in her father; Breast cancer in her paternal aunt; Heart disease in her daughter; No Known Problems in her brother, brother, daughter, and son; Osteoporosis in her mother; Ovarian cancer in her paternal aunt.    Social History     Tobacco Use    Smoking status: Never Smoker    Smokeless tobacco: Never Used   Substance Use Topics    Alcohol use: Yes     Alcohol/week: 0.8 standard drinks     Types: 1 Standard drinks or equivalent per week     Frequency: Monthly or less     Drinks per session: 1 or 2     Binge frequency: Never     Comment: once a month    Drug use: No       Review of Systems   Constitutional: Negative for chills, fever, malaise/fatigue and weight loss.   Respiratory: Negative for cough, sputum production, shortness of breath and wheezing.    Gastrointestinal: Positive for abdominal pain and diarrhea. Negative for blood in stool, constipation, melena, nausea and vomiting.   Genitourinary:  "Negative for dysuria, flank pain, frequency, hematuria and urgency.   Musculoskeletal: Positive for myalgias.   Neurological: Negative for headaches.        Outpatient Encounter Medications as of 11/23/2020   Medication Sig Note Dispense Refill    famotidine (PEPCID) 40 MG tablet Take 40 mg by mouth nightly as needed.  10/28/2016: Received from: External Pharmacy      plecanatide (TRULANCE) 3 mg Tab Take 3 mg by mouth once daily.       polyethylene glycol (GLYCOLAX) 17 gram PwPk Take by mouth every evening.       prochlorperazine (COMPAZINE) 10 MG tablet Take 1 tablet (10 mg total) by mouth 2 (two) times daily as needed (nausea with headache). 7/24/2020: Prn only  20 tablet 5    verapamil (CALAN-SR) 180 MG CR tablet TAKE 1/2 TABLET ONCE A DAY WITH FOOD (Patient taking differently: Take 180 mg by mouth once daily. )  15 tablet 3    fluticasone (FLONASE) 50 mcg/actuation nasal spray 1 spray by Each Nare route once daily. 7/24/2020: Prn       pantoprazole (PROTONIX) 40 MG tablet Take 40 mg by mouth once daily.  11/16/2016: Received from: External Pharmacy       No facility-administered encounter medications on file as of 11/23/2020.         Review of patient's allergies indicates:   Allergen Reactions    Biaxin [clarithromycin]     Imitrex [sumatriptan succinate] Palpitations    Maxalt [rizatriptan] Palpitations           Physical Exam      Vital Signs  Temp: 97.8 °F (36.6 °C)  Temp src: Oral  Pulse: 89  SpO2: 99 %  BP: 102/62  Pain Score: 0-No pain  Height and Weight  Height: 5' 6" (167.6 cm)  Weight: 58.7 kg (129 lb 4.8 oz)  BSA (Calculated - sq m): 1.65 sq meters  BMI (Calculated): 20.9  Weight in (lb) to have BMI = 25: 154.6]    Physical Exam  Constitutional:       General: She is not in acute distress.     Appearance: She is well-developed. She is not diaphoretic.   HENT:      Head: Normocephalic and atraumatic.      Right Ear: External ear normal.      Left Ear: External ear normal.      Nose: Nose " normal.   Eyes:      General:         Right eye: No discharge.         Left eye: No discharge.      Conjunctiva/sclera: Conjunctivae normal.   Neck:      Musculoskeletal: Normal range of motion.   Cardiovascular:      Rate and Rhythm: Normal rate and regular rhythm.      Pulses: Normal pulses.      Heart sounds: Normal heart sounds. No murmur.   Pulmonary:      Effort: Pulmonary effort is normal. No respiratory distress.      Breath sounds: No wheezing or rhonchi.   Abdominal:      General: Abdomen is flat. Bowel sounds are normal. There is no distension.      Palpations: Abdomen is soft.      Tenderness: There is abdominal tenderness. There is no right CVA tenderness or left CVA tenderness.      Comments: rlq ttp    Musculoskeletal: Normal range of motion.   Skin:     Coloration: Skin is not pale.      Findings: No rash.   Neurological:      Mental Status: She is alert and oriented to person, place, and time.   Psychiatric:         Behavior: Behavior normal.         Thought Content: Thought content normal.         Judgment: Judgment normal.          Laboratory:  CBC:  No results for input(s): WBC, RBC, HGB, HCT, PLT, MCV, MCH, MCHC in the last 2160 hours.  CMP:  No results for input(s): GLU, CALCIUM, ALBUMIN, PROT, NA, K, CO2, CL, BUN, ALKPHOS, ALT, AST, BILITOT in the last 2160 hours.    Invalid input(s): CREATININ  URINALYSIS:  No results for input(s): COLORU, CLARITYU, SPECGRAV, PHUR, PROTEINUA, GLUCOSEU, BILIRUBINCON, BLOODU, WBCU, RBCU, BACTERIA, MUCUS, NITRITE, LEUKOCYTESUR, UROBILINOGEN, HYALINECASTS in the last 2160 hours.   LIPIDS:  No results for input(s): TSH, HDL, CHOL, TRIG, LDLCALC, CHOLHDL, NONHDLCHOL, TOTALCHOLEST in the last 2160 hours.  TSH:  No results for input(s): TSH in the last 2160 hours.  A1C:  No results for input(s): HGBA1C in the last 2160 hours.    Radiology:      Assessment/Plan     Mandi Juan is a 50 y.o.female with:    Right lower quadrant pain  Start with ua and cx  ddx kidney  stone, ovarian problem, appendicitis though this is low on differential   Will start with renal us and and pelvic u/s   To er if symptoms worsen, discussed red flag symptoms       Orders Placed This Encounter   Procedures    US Retroperitoneal Complete (Kidney and     Standing Status:   Future     Standing Expiration Date:   11/23/2021     Order Specific Question:   May the Radiologist modify the order per protocol to meet the clinical needs of the patient?     Answer:   Yes    US Pelvis Complete Non OB     Standing Status:   Future     Standing Expiration Date:   11/23/2021     Order Specific Question:   May the Radiologist modify the order per protocol to meet the clinical needs of the patient?     Answer:   Yes    Urinalysis, Reflex to Urine Culture Urine, Clean Catch     Standing Status:   Future     Standing Expiration Date:   11/23/2021     Order Specific Question:   Preferred Collection Type     Answer:   Urine, Clean Catch     Order Specific Question:   Specimen Source     Answer:   Urine       Use of the [facility] Patient Portal discussed and encouraged during today's visit  -Continue current medications and maintain follow up with specialists.  Return to clinic in 1 week.  No future appointments.    Marie Olivas MD  11/23/2020 8:48 AM    [facility] - [facility]                     complains of pain/discomfort

## 2023-09-11 ENCOUNTER — PATIENT MESSAGE (OUTPATIENT)
Dept: OBSTETRICS AND GYNECOLOGY | Facility: CLINIC | Age: 53
End: 2023-09-11
Payer: COMMERCIAL

## 2023-09-12 DIAGNOSIS — N95.1 PERIMENOPAUSE: ICD-10-CM

## 2023-09-12 RX ORDER — NORETHINDRONE ACETATE AND ETHINYL ESTRADIOL 1MG-20(21)
1 KIT ORAL DAILY
Qty: 84 TABLET | Refills: 0 | Status: SHIPPED | OUTPATIENT
Start: 2023-09-12 | End: 2023-11-09 | Stop reason: SDUPTHER

## 2023-09-12 NOTE — TELEPHONE ENCOUNTER
Pt requesting refill. Pt is asking if you can note on the prescription that she skips the placebo pills.

## 2023-10-20 ENCOUNTER — CLINICAL SUPPORT (OUTPATIENT)
Dept: OTHER | Facility: CLINIC | Age: 53
End: 2023-10-20

## 2023-10-20 DIAGNOSIS — Z00.8 ENCOUNTER FOR OTHER GENERAL EXAMINATION: ICD-10-CM

## 2023-10-21 VITALS
DIASTOLIC BLOOD PRESSURE: 74 MMHG | SYSTOLIC BLOOD PRESSURE: 122 MMHG | BODY MASS INDEX: 21.21 KG/M2 | WEIGHT: 132 LBS | HEIGHT: 66 IN

## 2023-10-21 LAB
GLUCOSE SERPL-MCNC: 98 MG/DL (ref 60–140)
HDLC SERPL-MCNC: 80 MG/DL
POC CHOLESTEROL, LDL (DOCK): 60 MG/DL
POC CHOLESTEROL, TOTAL: 157 MG/DL
TRIGL SERPL-MCNC: 91 MG/DL

## 2023-11-09 ENCOUNTER — OFFICE VISIT (OUTPATIENT)
Dept: OBSTETRICS AND GYNECOLOGY | Facility: CLINIC | Age: 53
End: 2023-11-09
Payer: COMMERCIAL

## 2023-11-09 VITALS
HEIGHT: 66 IN | BODY MASS INDEX: 21.55 KG/M2 | DIASTOLIC BLOOD PRESSURE: 70 MMHG | SYSTOLIC BLOOD PRESSURE: 116 MMHG | WEIGHT: 134.06 LBS

## 2023-11-09 DIAGNOSIS — Z87.42 HISTORY OF OVARIAN CYST: Primary | ICD-10-CM

## 2023-11-09 DIAGNOSIS — Z12.31 BREAST CANCER SCREENING BY MAMMOGRAM: ICD-10-CM

## 2023-11-09 DIAGNOSIS — N95.1 PERIMENOPAUSE: ICD-10-CM

## 2023-11-09 PROCEDURE — 3074F SYST BP LT 130 MM HG: CPT | Mod: CPTII,S$GLB,, | Performed by: OBSTETRICS & GYNECOLOGY

## 2023-11-09 PROCEDURE — 3008F PR BODY MASS INDEX (BMI) DOCUMENTED: ICD-10-PCS | Mod: CPTII,S$GLB,, | Performed by: OBSTETRICS & GYNECOLOGY

## 2023-11-09 PROCEDURE — 1160F RVW MEDS BY RX/DR IN RCRD: CPT | Mod: CPTII,S$GLB,, | Performed by: OBSTETRICS & GYNECOLOGY

## 2023-11-09 PROCEDURE — 3074F PR MOST RECENT SYSTOLIC BLOOD PRESSURE < 130 MM HG: ICD-10-PCS | Mod: CPTII,S$GLB,, | Performed by: OBSTETRICS & GYNECOLOGY

## 2023-11-09 PROCEDURE — 3008F BODY MASS INDEX DOCD: CPT | Mod: CPTII,S$GLB,, | Performed by: OBSTETRICS & GYNECOLOGY

## 2023-11-09 PROCEDURE — 1159F MED LIST DOCD IN RCRD: CPT | Mod: CPTII,S$GLB,, | Performed by: OBSTETRICS & GYNECOLOGY

## 2023-11-09 PROCEDURE — 3078F DIAST BP <80 MM HG: CPT | Mod: CPTII,S$GLB,, | Performed by: OBSTETRICS & GYNECOLOGY

## 2023-11-09 PROCEDURE — 1160F PR REVIEW ALL MEDS BY PRESCRIBER/CLIN PHARMACIST DOCUMENTED: ICD-10-PCS | Mod: CPTII,S$GLB,, | Performed by: OBSTETRICS & GYNECOLOGY

## 2023-11-09 PROCEDURE — 1159F PR MEDICATION LIST DOCUMENTED IN MEDICAL RECORD: ICD-10-PCS | Mod: CPTII,S$GLB,, | Performed by: OBSTETRICS & GYNECOLOGY

## 2023-11-09 PROCEDURE — 99396 PR PREVENTIVE VISIT,EST,40-64: ICD-10-PCS | Mod: S$GLB,,, | Performed by: OBSTETRICS & GYNECOLOGY

## 2023-11-09 PROCEDURE — 99396 PREV VISIT EST AGE 40-64: CPT | Mod: S$GLB,,, | Performed by: OBSTETRICS & GYNECOLOGY

## 2023-11-09 PROCEDURE — 3078F PR MOST RECENT DIASTOLIC BLOOD PRESSURE < 80 MM HG: ICD-10-PCS | Mod: CPTII,S$GLB,, | Performed by: OBSTETRICS & GYNECOLOGY

## 2023-11-09 RX ORDER — FAMOTIDINE 20 MG/1
20 TABLET, FILM COATED ORAL 2 TIMES DAILY
COMMUNITY
Start: 2023-09-08

## 2023-11-09 RX ORDER — VERAPAMIL HYDROCHLORIDE 180 MG/1
TABLET, FILM COATED, EXTENDED RELEASE ORAL
COMMUNITY
End: 2023-11-09 | Stop reason: SDUPTHER

## 2023-11-09 RX ORDER — PLECANATIDE 3 MG/1
TABLET ORAL
COMMUNITY
End: 2023-11-09 | Stop reason: SDUPTHER

## 2023-11-09 RX ORDER — NORETHINDRONE ACETATE AND ETHINYL ESTRADIOL 1MG-20(21)
1 KIT ORAL DAILY
Qty: 84 TABLET | Refills: 4 | Status: SHIPPED | OUTPATIENT
Start: 2023-11-09 | End: 2024-11-08

## 2023-11-09 RX ORDER — NORETHINDRONE ACETATE AND ETHINYL ESTRADIOL 1MG-20(21)
1 KIT ORAL DAILY
Qty: 84 TABLET | Refills: 3 | Status: SHIPPED | OUTPATIENT
Start: 2023-11-09 | End: 2023-11-09

## 2023-11-09 NOTE — PROGRESS NOTES
Chief Complaint: Well Woman Exam     HPI:      Mandi Juan is a 53 y.o.  who presents today for well woman exam.  LMP: No LMP recorded (lmp unknown). (Menstrual status: Birth Control).  No issues, problems, or complaints. Notes some mild decreased libido but notes significant improvement in pelvic pain. Continues to follow with GI for painful rectal spasms. Specifically, patient denies abnormal vaginal bleeding, discharge, pelvic pain, urinary problems, or changes in appetite. Ms. Juan is currently sexually active with a single male partner. She is currently using oral contraceptives (estrogen/progesterone) for contraception. She declines STD screening today.    Previous Pap:  no abnormalities (10/26/2022)  Previous Mammogram:    Results for orders placed during the hospital encounter of 22    Mammo Digital Screening Bilat w/ Chris    Narrative  Result:  Mammo Digital Screening Bilat w/ Chris    History:  Patient is 52 y.o. and is seen for a screening mammogram.      Films Compared:  Compared to: 2021 Mammo Digital Diagnostic Left with Chris, 11/10/2021 Mammo Digital Screening Bilat w/ Chris, 10/20/2020 Mammo Digital Diagnostic Left with Chris, 2020 Mammo Digital Screening Bilat w/ Chris, and 2019 Mammo Digital Screening Bilat w/ Chris    Findings:  This procedure was performed using tomosynthesis.  Computer-aided detection was utilized in the interpretation of this examination.    The breasts are heterogeneously dense, which may obscure small masses. There is no evidence of suspicious masses, microcalcifications or architectural distortion.    Impression  No mammographic evidence of malignancy.    BI-RADS Category 1: Negative    Recommendation:  Routine screening mammogram in 1 year is recommended.    Your estimated lifetime risk of breast cancer (to age 85) based on Tyrer-Cuzick risk assessment model is 8.89 %.  According to the American [employer] Society, patients with a lifetime breast  cancer risk of 20% or higher might benefit from supplemental screening tests. ??     Most Recent Dexa: not indicated  Colonoscopy: 2018    COVID vaccine: completed series  Gardasil:has never had     Patient Active Problem List   Diagnosis    GERD (gastroesophageal reflux disease)    Migraines    Irritable bowel syndrome with constipation    Anxiety    Meningocele spinal    Palpitations    Mixed stress and urge urinary incontinence    Multiple joint pain    Fatigue       Past Medical History:   Diagnosis Date    Anxiety     COVID-19     GERD (gastroesophageal reflux disease)     Internal hemorrhoids     Irritable bowel syndrome     Migraines     Palpitations     UTI (urinary tract infection)        Past Surgical History:   Procedure Laterality Date     SECTION      COLONOSCOPY      hemorrhoid banding  2022    TONSILLECTOMY         OB History          2    Para   2    Term   2            AB        Living   3         SAB        IAB        Ectopic        Multiple   1    Live Births   2           Obstetric Comments     No abnormal pap  No STDs   x 1, CD x 1  (TIUP 37 weeks)               ROS:     Review of Systems   Constitutional:  Negative for activity change, fatigue and unexpected weight change.   Respiratory:  Negative for shortness of breath.    Cardiovascular:  Negative for chest pain.   Gastrointestinal:  Negative for abdominal pain, blood in stool, constipation and diarrhea.   Endocrine: Negative for cold intolerance and heat intolerance.   Genitourinary:  Positive for dyspareunia and pelvic pain. Negative for bladder incontinence, dysuria, frequency, hot flashes, vaginal bleeding and vaginal dryness.   Integumentary:  Negative for breast mass, breast discharge and breast tenderness.   Psychiatric/Behavioral:  Negative for dysphoric mood. The patient is not nervous/anxious.    Breast: Negative for mass and tenderness      Physical Exam:      PHYSICAL EXAM:  /70   Ht 5'  "6" (1.676 m)   Wt 60.8 kg (134 lb 0.6 oz)   LMP  (LMP Unknown)   BMI 21.63 kg/m²   Body mass index is 21.63 kg/m².     APPEARANCE: Well nourished, well developed, in no acute distress.  PSYCH: Appropriate mood and affect.  SKIN: No acne or hirsutism  NECK: Neck symmetric without masses or thyromegaly  NODES: No inguinal, axillary, or supraclavicular lymph node enlargement  ABDOMEN: Soft.  No tenderness or masses.    CARDIOVASCULAR: No edema of peripheral extremities  BREASTS: Symmetrical, no skin changes or visible lesions.  No palpable masses or nipple discharge bilaterally.  PELVIC: Normal external genitalia without lesions.  Normal hair distribution.  Adequate perineal body, normal urethral meatus.  Vagina moist and well rugated without lesions or discharge.  Cervix pink, without lesions, discharge or tenderness.  No significant cystocele or rectocele.  Bimanual exam shows uterus to be normal size, regular, mobile and nontender.  Adnexa without masses or tenderness.      Assessment:     1. History of ovarian cyst  norethindrone-ethinyl estradiol (JUNEL FE 1/20) 1 mg-20 mcg (21)/75 mg (7) per tablet    DISCONTINUED: norethindrone-ethinyl estradiol (JUNEL FE 1/20) 1 mg-20 mcg (21)/75 mg (7) per tablet      2. Perimenopause  norethindrone-ethinyl estradiol (JUNEL FE 1/20) 1 mg-20 mcg (21)/75 mg (7) per tablet    DISCONTINUED: norethindrone-ethinyl estradiol (JUNEL FE 1/20) 1 mg-20 mcg (21)/75 mg (7) per tablet      3. Breast cancer screening by mammogram  Mammo Digital Screening Bilat w/ Chris            Plan:     Clinical breast exam performed.  Pap UTD.  Mammogram ordered.  DEXA at 64.  Colonoscopy at 44 or as needed.  Contraception: continue continuous GUMARO, discussed risks and benefits of continuing, given ovarian cyst last year continue for now.  Follow up in about 1 year (around 11/9/2024) for annual well woman exam or as needed.    Counseling:     Patient was counseled today on current ASCCP pap guidelines, " the recommendation for yearly pelvic exams, healthy diet and exercise routines, breast self awareness and annual mammograms.She is to see her PCP for other health maintenance.     Use of the [facility] Patient Portal discussed and encouraged during today's visit.         Eleni Alaniz MD  [facility]  11/09/2023

## 2023-12-15 ENCOUNTER — OFFICE VISIT (OUTPATIENT)
Dept: PRIMARY CARE CLINIC | Facility: CLINIC | Age: 53
End: 2023-12-15
Payer: COMMERCIAL

## 2023-12-15 VITALS
BODY MASS INDEX: 22.06 KG/M2 | HEART RATE: 89 BPM | WEIGHT: 136.69 LBS | OXYGEN SATURATION: 98 % | DIASTOLIC BLOOD PRESSURE: 70 MMHG | SYSTOLIC BLOOD PRESSURE: 110 MMHG

## 2023-12-15 DIAGNOSIS — M26.609 TMJ (TEMPOROMANDIBULAR JOINT SYNDROME): Primary | ICD-10-CM

## 2023-12-15 PROCEDURE — 99214 OFFICE O/P EST MOD 30 MIN: CPT | Mod: S$GLB,,, | Performed by: NURSE PRACTITIONER

## 2023-12-15 PROCEDURE — 99999 PR PBB SHADOW E&M-EST. PATIENT-LVL III: CPT | Mod: PBBFAC,,, | Performed by: NURSE PRACTITIONER

## 2023-12-15 PROCEDURE — 3078F PR MOST RECENT DIASTOLIC BLOOD PRESSURE < 80 MM HG: ICD-10-PCS | Mod: CPTII,S$GLB,, | Performed by: NURSE PRACTITIONER

## 2023-12-15 PROCEDURE — 3074F PR MOST RECENT SYSTOLIC BLOOD PRESSURE < 130 MM HG: ICD-10-PCS | Mod: CPTII,S$GLB,, | Performed by: NURSE PRACTITIONER

## 2023-12-15 PROCEDURE — 3074F SYST BP LT 130 MM HG: CPT | Mod: CPTII,S$GLB,, | Performed by: NURSE PRACTITIONER

## 2023-12-15 PROCEDURE — 3008F BODY MASS INDEX DOCD: CPT | Mod: CPTII,S$GLB,, | Performed by: NURSE PRACTITIONER

## 2023-12-15 PROCEDURE — 3008F PR BODY MASS INDEX (BMI) DOCUMENTED: ICD-10-PCS | Mod: CPTII,S$GLB,, | Performed by: NURSE PRACTITIONER

## 2023-12-15 PROCEDURE — 3078F DIAST BP <80 MM HG: CPT | Mod: CPTII,S$GLB,, | Performed by: NURSE PRACTITIONER

## 2023-12-15 PROCEDURE — 1159F PR MEDICATION LIST DOCUMENTED IN MEDICAL RECORD: ICD-10-PCS | Mod: CPTII,S$GLB,, | Performed by: NURSE PRACTITIONER

## 2023-12-15 PROCEDURE — 1159F MED LIST DOCD IN RCRD: CPT | Mod: CPTII,S$GLB,, | Performed by: NURSE PRACTITIONER

## 2023-12-15 PROCEDURE — 99999 PR PBB SHADOW E&M-EST. PATIENT-LVL III: ICD-10-PCS | Mod: PBBFAC,,, | Performed by: NURSE PRACTITIONER

## 2023-12-15 PROCEDURE — 99214 PR OFFICE/OUTPT VISIT, EST, LEVL IV, 30-39 MIN: ICD-10-PCS | Mod: S$GLB,,, | Performed by: NURSE PRACTITIONER

## 2023-12-15 RX ORDER — CYCLOBENZAPRINE HCL 5 MG
5 TABLET ORAL NIGHTLY
Qty: 10 TABLET | Refills: 0 | Status: SHIPPED | OUTPATIENT
Start: 2023-12-15 | End: 2023-12-25

## 2023-12-15 NOTE — PROGRESS NOTES
[facility] Note    Chief Complaint      Chief Complaint   Patient presents with    Facial Pain       History of Present Illness      Mandi Juan is a 53 y.o. female with chronic conditions of anxiety, IBS, migraines, palpitations who presents today for: complaining of right tempormandibular joint pain, hurts to eat. Denies nasal congestion, fever, or chills. Has been happening since gi. Worse after eating, throbbing. Denies trouble swallowing. Has been doing soft foods. Does have popping when open mandible.     Past Medical History:  Past Medical History:   Diagnosis Date    Anxiety     BRCA1 negative     BRCA2 negative     COVID-19     GERD (gastroesophageal reflux disease)     Internal hemorrhoids     Irritable bowel syndrome     Migraines     Palpitations     UTI (urinary tract infection)        Past Surgical History:   has a past surgical history that includes  section; Tonsillectomy; Colonoscopy; and hemorrhoid banding (2022).    Family History:  family history includes Arthritis in her father, paternal aunt, and paternal aunt; Breast cancer (age of onset: 50) in her paternal aunt; COPD in her father; Cancer in her maternal grandmother; Colon cancer (age of onset: 58) in her maternal grandmother; Diabetes in her paternal grandmother; Heart disease in her daughter; No Known Problems in her brother, brother, daughter, and son; Osteoporosis in her mother; Uterine cancer (age of onset: 55) in her paternal aunt.     Social History:  Social History     Tobacco Use    Smoking status: Never    Smokeless tobacco: Never   Substance Use Topics    Alcohol use: Yes     Comment: Seldomly drink    Drug use: No       Review of Systems   Constitutional:  Negative for chills and fever.   Respiratory:  Negative for cough and shortness of breath.    Cardiovascular:  Negative for chest pain and palpitations.   Gastrointestinal:  Negative for constipation, diarrhea, nausea and vomiting.    Genitourinary:  Negative for dysuria and hematuria.   Musculoskeletal:  Negative for falls.   Neurological:  Negative for headaches.        Medications:  Outpatient Encounter Medications as of 12/15/2023   Medication Sig Dispense Refill    famotidine (PEPCID) 20 MG tablet Take 20 mg by mouth 2 (two) times daily.      flecainide (TAMBOCOR) 50 MG Tab Take 50 mg by mouth as needed.      hyoscyamine (ANASPAZ,LEVSIN) 0.125 mg Tab Take 125 mcg by mouth every 4 (four) hours as needed.      norethindrone-ethinyl estradiol (JUNEL FE 1/20) 1 mg-20 mcg (21)/75 mg (7) per tablet Take 1 tablet by mouth once daily. Take active pills continuously by skipping placebo and proceeding directly to new pill pack. 84 tablet 4    plecanatide (TRULANCE) 3 mg Tab Take 3 mg by mouth once daily.      verapamil (CALAN-SR) 180 MG CR tablet TAKE 1/2 TABLET ONCE A DAY WITH FOOD (Patient taking differently: Take 180 mg by mouth once daily.) 15 tablet 3    cyclobenzaprine (FLEXERIL) 5 MG tablet Take 1 tablet (5 mg total) by mouth nightly. for 10 days 10 tablet 0     No facility-administered encounter medications on file as of 12/15/2023.       Allergies:  Review of patient's allergies indicates:   Allergen Reactions    Biaxin [clarithromycin]     Imitrex [sumatriptan succinate] Palpitations    Maxalt [rizatriptan] Palpitations       Health Maintenance:  Immunization History   Administered Date(s) Administered    COVID-19 Vaccine 03/06/2021, 04/05/2021, 11/30/2021    Influenza 09/21/2018    Tdap 09/29/2021      Health Maintenance   Topic Date Due    Shingles Vaccine (1 of 2) Never done    Mammogram  12/04/2024    Lipid Panel  10/20/2028    Colorectal Cancer Screening  10/30/2029    TETANUS VACCINE  09/29/2031    Hepatitis C Screening  Completed        Physical Exam      Vital Signs  Pulse: 89  SpO2: 98 %  BP: 110/70  BP Location: Right arm  Pain Score:   6  Pain Loc: Face  Height and Weight  Weight: 62 kg (136 lb 11 oz)]    Physical  Exam  Constitutional:       Appearance: She is well-developed.   HENT:      Head: Normocephalic and atraumatic.      Mouth/Throat:      Comments: TTP to R TMJ area.   No swelling or redness.  Cardiovascular:      Rate and Rhythm: Normal rate and regular rhythm.      Heart sounds: Normal heart sounds. No murmur heard.  Pulmonary:      Effort: Pulmonary effort is normal. No respiratory distress.      Breath sounds: Normal breath sounds.   Musculoskeletal:      Cervical back: Normal range of motion.   Skin:     General: Skin is warm and dry.   Neurological:      Mental Status: She is alert. Mental status is at baseline.   Psychiatric:         Behavior: Behavior normal.          Laboratory:  CBC:  Recent Labs   Lab 08/25/22  1555   WBC 7.77   RBC 4.57   Hemoglobin 14.4   Hematocrit 43.7   Platelets 275   MCV 96   MCH 31.5 H   MCHC 33.0     CMP:  Recent Labs   Lab 09/12/22  0742 11/30/22  0747   Glucose 104  --    Calcium 9.1  --    Albumin 4.6 4.4   Total Protein 7.3 7.3   Sodium 141  --    Potassium 4.5  --    CO2 31 H  --    Chloride 102  --    BUN 18 H  --    Alkaline Phosphatase 116 76   ALT 52 H 46 H   AST 42 42   Total Bilirubin 1.3 H 0.9     URINALYSIS:       LIPIDS:  Recent Labs   Lab 08/25/22  1555 09/12/22  0742   TSH 1.280 1.370   HDL  --  91 H   Cholesterol  --  191   Triglycerides  --  73   LDL Cholesterol  --  85.4   HDL/Cholesterol Ratio  --  47.6   Non-HDL Cholesterol  --  100   Total Cholesterol/HDL Ratio  --  2.1     TSH:  Recent Labs   Lab 08/25/22  1555 09/12/22  0742   TSH 1.280 1.370     A1C:        Assessment/Plan     Mandi Juan is a 53 y.o.female with:    1. TMJ (temporomandibular joint syndrome)  - cyclobenzaprine (FLEXERIL) 5 MG tablet; Take 1 tablet (5 mg total) by mouth nightly. for 10 days  Dispense: 10 tablet; Refill: 0  Continue NSAIDS, soft food diet, follow up dentist, discuss potential mouthguard  Discussed stress reduction techniques.   Return to clinic If no improvement.      Chronic conditions status updated as per HPI.  Other than changes above, cont current medications and maintain follow up with specialists.  No follow-ups on file.    No future appointments.    Adreinne Cotaya, FNP [facility]

## 2024-05-10 ENCOUNTER — OFFICE VISIT (OUTPATIENT)
Dept: FAMILY MEDICINE | Facility: CLINIC | Age: 54
End: 2024-05-10
Payer: COMMERCIAL

## 2024-05-10 VITALS
WEIGHT: 135.56 LBS | SYSTOLIC BLOOD PRESSURE: 126 MMHG | HEIGHT: 66 IN | BODY MASS INDEX: 21.79 KG/M2 | OXYGEN SATURATION: 98 % | TEMPERATURE: 99 F | HEART RATE: 93 BPM | DIASTOLIC BLOOD PRESSURE: 72 MMHG

## 2024-05-10 DIAGNOSIS — R00.2 PALPITATIONS: ICD-10-CM

## 2024-05-10 DIAGNOSIS — Z00.00 WELLNESS EXAMINATION: ICD-10-CM

## 2024-05-10 DIAGNOSIS — K21.9 GASTROESOPHAGEAL REFLUX DISEASE WITHOUT ESOPHAGITIS: ICD-10-CM

## 2024-05-10 DIAGNOSIS — R53.83 FATIGUE, UNSPECIFIED TYPE: ICD-10-CM

## 2024-05-10 DIAGNOSIS — G43.809 OTHER MIGRAINE WITHOUT STATUS MIGRAINOSUS, NOT INTRACTABLE: ICD-10-CM

## 2024-05-10 DIAGNOSIS — Q05.9 MENINGOCELE SPINAL: ICD-10-CM

## 2024-05-10 DIAGNOSIS — Z76.89 ENCOUNTER TO ESTABLISH CARE WITH NEW DOCTOR: Primary | ICD-10-CM

## 2024-05-10 DIAGNOSIS — K58.1 IRRITABLE BOWEL SYNDROME WITH CONSTIPATION: ICD-10-CM

## 2024-05-10 PROBLEM — M25.50 MULTIPLE JOINT PAIN: Status: RESOLVED | Noted: 2022-09-08 | Resolved: 2024-05-10

## 2024-05-10 PROBLEM — N39.46 MIXED STRESS AND URGE URINARY INCONTINENCE: Status: RESOLVED | Noted: 2022-02-11 | Resolved: 2024-05-10

## 2024-05-10 PROCEDURE — 3074F SYST BP LT 130 MM HG: CPT | Mod: CPTII,S$GLB,, | Performed by: STUDENT IN AN ORGANIZED HEALTH CARE EDUCATION/TRAINING PROGRAM

## 2024-05-10 PROCEDURE — 99999 PR PBB SHADOW E&M-EST. PATIENT-LVL IV: CPT | Mod: PBBFAC,,, | Performed by: STUDENT IN AN ORGANIZED HEALTH CARE EDUCATION/TRAINING PROGRAM

## 2024-05-10 PROCEDURE — 1160F RVW MEDS BY RX/DR IN RCRD: CPT | Mod: CPTII,S$GLB,, | Performed by: STUDENT IN AN ORGANIZED HEALTH CARE EDUCATION/TRAINING PROGRAM

## 2024-05-10 PROCEDURE — 1159F MED LIST DOCD IN RCRD: CPT | Mod: CPTII,S$GLB,, | Performed by: STUDENT IN AN ORGANIZED HEALTH CARE EDUCATION/TRAINING PROGRAM

## 2024-05-10 PROCEDURE — 3078F DIAST BP <80 MM HG: CPT | Mod: CPTII,S$GLB,, | Performed by: STUDENT IN AN ORGANIZED HEALTH CARE EDUCATION/TRAINING PROGRAM

## 2024-05-10 PROCEDURE — 99396 PREV VISIT EST AGE 40-64: CPT | Mod: S$GLB,,, | Performed by: STUDENT IN AN ORGANIZED HEALTH CARE EDUCATION/TRAINING PROGRAM

## 2024-05-10 PROCEDURE — 3008F BODY MASS INDEX DOCD: CPT | Mod: CPTII,S$GLB,, | Performed by: STUDENT IN AN ORGANIZED HEALTH CARE EDUCATION/TRAINING PROGRAM

## 2024-05-10 NOTE — LETTER
AUTHORIZATION FOR RELEASE OF   CONFIDENTIAL INFORMATION    Dear Dr. Haque,    We are seeing Mandi Juan, date of birth 1970, in the clinic at [facility]. Ericka Jackman DO is the patient's PCP. Mandi Juan has an outstanding lab/procedure at the time we reviewed her chart. In order to help keep her health information updated, she has authorized us to request the following medical record(s):        (  )  MAMMOGRAM                                      (  )  COLONOSCOPY      (  )  PAP SMEAR                                          ( x )  OUTSIDE LAB RESULTS     (  )  DEXA SCAN                                          (  )  EYE EXAM            (  )  FOOT EXAM                                          (  )  ENTIRE RECORD     (  )  OUTSIDE IMMUNIZATIONS                 (  )  _______________         Please fax records to [facility], Champagne, Sarah A., DO, [phone]     If you have any questions, please contact Colleen at [phone].           Patient Name: Mandi Juan  : 1970  Patient Phone #: [phone]

## 2024-05-10 NOTE — PROGRESS NOTES
Subjective:       Patient ID: Mandi Juan is a 53 y.o. female.    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     + tail bone pain  Tolerates and does not let hold her back       Palpitations 07/24/2020     Managed per Shabnam vizcaino  PRN flecainide   Comes and goes       GERD (gastroesophageal reflux disease)      Pepcid PRN   stable      Migraines      Stable  Follows with neuro  Verapamil 180 daily       Irritable bowel syndrome with constipation      Severe; long redundant colon with scar tissue   Tries to avoid constipation as is high risk for obstruction   Does follow with GI   Takes trulance in AM and miralax in PM  + diarrhea but prefers this to obstruction           Review of Systems   All other systems reviewed and are negative.       A1C:      CBC:  Recent Labs   Lab 08/25/22  1555   WBC 7.77   RBC 4.57   Hemoglobin 14.4   Hematocrit 43.7   Platelets 275   MCV 96   MCH 31.5 H   MCHC 33.0     CMP:  Recent Labs   Lab 09/12/22  0742 11/30/22  0747   Glucose 104  --    Calcium 9.1  --    Albumin 4.6 4.4   Total Protein 7.3 7.3   Sodium 141  --    Potassium 4.5  --    CO2 31 H  --    Chloride 102  --    BUN 18 H  --    Creatinine 1.00  --    Alkaline Phosphatase 116 76   ALT 52 H 46 H   AST 42 42   Total Bilirubin 1.3 H 0.9     LIPIDS:  Recent Labs   Lab 09/12/22  0742   TSH 1.370   HDL 91 H   Cholesterol 191   Triglycerides 73   LDL Cholesterol 85.4   HDL/Cholesterol Ratio 47.6   Non-HDL Cholesterol 100   Total Cholesterol/HDL Ratio 2.1     TSH:  Recent Labs   Lab 08/25/22  1555 09/12/22  0742   TSH 1.280 1.370        Objective:      Vitals:    05/10/24 0837   BP: 126/72   Pulse: 93   Temp: 99 °F (37.2 °C)      Physical Exam  Vitals  reviewed.   Constitutional:       Appearance: Normal appearance. She is normal weight.   HENT:      Head: Normocephalic and atraumatic.   Eyes:      Conjunctiva/sclera: Conjunctivae normal.   Cardiovascular:      Rate and Rhythm: Normal rate and regular rhythm.      Heart sounds: Normal heart sounds.   Pulmonary:      Effort: Pulmonary effort is normal.      Breath sounds: Normal breath sounds.   Abdominal:      Palpations: Abdomen is soft.      Tenderness: There is no abdominal tenderness.   Musculoskeletal:         General: Normal range of motion.      Cervical back: Normal range of motion.      Right lower leg: No edema.      Left lower leg: No edema.   Neurological:      Mental Status: She is alert. Mental status is at baseline.   Psychiatric:         Mood and Affect: Mood normal.         Behavior: Behavior normal.          Assessment:       1. Encounter to establish care with new doctor    2. Wellness examination    3. Other migraine without status migrainosus, not intractable    4. Meningocele spinal    5. Palpitations    6. Irritable bowel syndrome with constipation    7. Gastroesophageal reflux disease without esophagitis    8. Fatigue, unspecified type        Plan:   1. Encounter to establish care with new doctor    2. Wellness examination    3. Other migraine without status migrainosus, not intractable    4. Meningocele spinal    5. Palpitations    6. Irritable bowel syndrome with constipation    7. Gastroesophageal reflux disease without esophagitis    8. Fatigue, unspecified type     Establish care and Well female  Labs will request from cards; Shabnam and review   HM discussed  UTD  Continue healthy lifestyle efforts  Continue current meds as prescribed otherwise; refills per request  Keep routine specialist f/u   RTC in 1 year with labs prior and/or PRN        Ericka Jackman   [facility]   5/10/24

## 2024-06-12 ENCOUNTER — PATIENT MESSAGE (OUTPATIENT)
Dept: OBSTETRICS AND GYNECOLOGY | Facility: CLINIC | Age: 54
End: 2024-06-12
Payer: COMMERCIAL

## 2024-06-18 ENCOUNTER — TELEPHONE (OUTPATIENT)
Dept: OBSTETRICS AND GYNECOLOGY | Facility: CLINIC | Age: 54
End: 2024-06-18
Payer: COMMERCIAL

## 2024-06-18 NOTE — TELEPHONE ENCOUNTER
Called to confirm whether patient wanted to do a virtual visit or an in person appointment. Patent scheduled for in office appointment on the 26th and would like to keep that appointment. I expressed understanding and took the hold off for the virtual appointment . Patient expressed understanding.

## 2024-06-26 ENCOUNTER — PATIENT MESSAGE (OUTPATIENT)
Dept: FAMILY MEDICINE | Facility: CLINIC | Age: 54
End: 2024-06-26
Payer: COMMERCIAL

## 2024-06-26 ENCOUNTER — OFFICE VISIT (OUTPATIENT)
Dept: OBSTETRICS AND GYNECOLOGY | Facility: CLINIC | Age: 54
End: 2024-06-26
Payer: COMMERCIAL

## 2024-06-26 VITALS — BODY MASS INDEX: 21.51 KG/M2 | WEIGHT: 133.81 LBS | HEIGHT: 66 IN

## 2024-06-26 DIAGNOSIS — N89.8 VAGINAL DRYNESS: ICD-10-CM

## 2024-06-26 DIAGNOSIS — R68.82 DECREASED LIBIDO: ICD-10-CM

## 2024-06-26 DIAGNOSIS — N95.1 PERIMENOPAUSE: Primary | ICD-10-CM

## 2024-06-26 DIAGNOSIS — R53.83 FATIGUE, UNSPECIFIED TYPE: ICD-10-CM

## 2024-06-26 DIAGNOSIS — Z87.42 HISTORY OF OVARIAN CYST: ICD-10-CM

## 2024-06-26 PROCEDURE — 99999 PR PBB SHADOW E&M-EST. PATIENT-LVL III: CPT | Mod: PBBFAC,,, | Performed by: OBSTETRICS & GYNECOLOGY

## 2024-06-26 PROCEDURE — 1160F RVW MEDS BY RX/DR IN RCRD: CPT | Mod: CPTII,S$GLB,, | Performed by: OBSTETRICS & GYNECOLOGY

## 2024-06-26 PROCEDURE — 1159F MED LIST DOCD IN RCRD: CPT | Mod: CPTII,S$GLB,, | Performed by: OBSTETRICS & GYNECOLOGY

## 2024-06-26 PROCEDURE — 3008F BODY MASS INDEX DOCD: CPT | Mod: CPTII,S$GLB,, | Performed by: OBSTETRICS & GYNECOLOGY

## 2024-06-26 PROCEDURE — 99214 OFFICE O/P EST MOD 30 MIN: CPT | Mod: S$GLB,,, | Performed by: OBSTETRICS & GYNECOLOGY

## 2024-06-26 RX ORDER — ESTRADIOL 10 UG/1
10 INSERT VAGINAL
Qty: 8 EACH | Refills: 11 | Status: SHIPPED | OUTPATIENT
Start: 2024-06-27 | End: 2024-07-27

## 2024-06-26 RX ORDER — NORETHINDRONE ACETATE AND ETHINYL ESTRADIOL 1MG-20(21)
1 KIT ORAL DAILY
Qty: 84 TABLET | Refills: 4 | Status: SHIPPED | OUTPATIENT
Start: 2024-06-26 | End: 2025-06-26

## 2024-06-26 RX ORDER — NORETHINDRONE ACETATE AND ETHINYL ESTRADIOL 1MG-20(21)
1 KIT ORAL DAILY
Qty: 84 TABLET | Refills: 4 | Status: SHIPPED | OUTPATIENT
Start: 2024-06-26 | End: 2024-06-26

## 2024-06-26 RX ORDER — ESTRADIOL 10 UG/1
10 INSERT VAGINAL DAILY
Qty: 14 EACH | Refills: 0 | Status: SHIPPED | OUTPATIENT
Start: 2024-06-26 | End: 2024-07-10

## 2024-06-26 NOTE — PROGRESS NOTES
Subjective:       Patient ID: Mandi Juan is a 53 y.o. female.    Chief Complaint: Hormone consult      History of Present Illness  52 yo  presents with  complaints of worsening brain fog, night sweats, vaginal dryness, and decreased libido. Reports 7 hours of sleep.  Is waking 2-3 times per night to urinate but is able to fall back asleep easily.  Denies anxiety or depression. Labs with cardiologist normal, no report available. Denies chest pain, shortness of breath. Does report worsening rectal pain.          2024    10:47 AM 2024    10:21 AM 5/10/2024     8:40 AM 12/15/2023     8:41 AM 2023    10:06 AM 3/8/2023    10:59 AM 2022     9:50 AM   Depression Patient Health Questionnaire   Over the last two weeks how often have you been bothered by little interest or pleasure in doing things Not at all Not at all Not at all Not at all Not at all Not at all Not at all   Over the last two weeks how often have you been bothered by feeling down, depressed or hopeless Not at all Not at all Not at all Not at all Not at all Not at all Not at all   PHQ-2 Total Score 0 0 0 0 0 0 0           Patient Active Problem List   Diagnosis    GERD (gastroesophageal reflux disease)    Migraines    Irritable bowel syndrome with constipation    Meningocele spinal    Palpitations    Fatigue       Past Medical History:   Diagnosis Date    Anxiety     BRCA1 negative     BRCA2 negative     COVID-19     GERD (gastroesophageal reflux disease)     Internal hemorrhoids     Irritable bowel syndrome     Migraines     Palpitations     UTI (urinary tract infection)        Past Surgical History:   Procedure Laterality Date     SECTION      COLONOSCOPY      hemorrhoid banding  2022    TONSILLECTOMY         OB History    Para Term  AB Living   2 2 2     3   SAB IAB Ectopic Multiple Live Births         1 2      # Outcome Date GA Lbr Heron/2nd Weight Sex Type Anes PTL Lv   2A Term      CS-Unspec     "  2B Term            1 Term      Vag-Spont         Obstetric Comments      No abnormal pap   No STDs    x 1, CD x 1  (TIUP 37 weeks)       No LMP recorded. Patient is perimenopausal.   Date of Last Pap: 10/26/2022    Review of Systems  Review of Systems   Constitutional:  Negative for activity change, chills, fatigue, fever and unexpected weight change.   Respiratory:  Negative for shortness of breath.    Cardiovascular:  Negative for chest pain.   Gastrointestinal:  Positive for abdominal pain and diarrhea. Negative for blood in stool, constipation, nausea and vomiting.   Endocrine: Negative for cold intolerance and heat intolerance.   Genitourinary:  Positive for pelvic pain. Negative for bladder incontinence, dyspareunia, dysuria, flank pain, frequency, hematuria, hot flashes, urgency, vaginal bleeding, vaginal discharge and vaginal dryness.   Musculoskeletal:  Negative for back pain.   Integumentary:  Negative for rash, breast mass, breast discharge and breast tenderness.   Neurological:  Positive for headaches.   Psychiatric/Behavioral:  Negative for dysphoric mood. The patient is not nervous/anxious.    Breast: Negative for mass and tenderness       Objective:   Ht 5' 6" (1.676 m)   Wt 60.7 kg (133 lb 13.1 oz)   BMI 21.60 kg/m²   Body mass index is 21.6 kg/m².    APPEARANCE: Well nourished, well developed, in no acute distress.  PSYCH: Appropriate mood and affect.  SKIN: No acne or hirsutism             Assessment/ Plan:     1. Perimenopause  norethindrone-ethinyl estradiol (JUNEL FE 1/20) 1 mg-20 mcg (21)/75 mg (7) per tablet    DISCONTINUED: norethindrone-ethinyl estradiol (JUNEL FE 1/20) 1 mg-20 mcg (21)/75 mg (7) per tablet      2. Fatigue, unspecified type  FOLLICLE STIMULATING HORMONE    Estradiol    TSH      3. Decreased libido        4. Vaginal dryness  estradioL (IMVEXXY STARTER PACK) 10 mcg InPk    estradioL (IMVEXXY MAINTENANCE PACK) 10 mcg Inst      5. History of ovarian cyst  " norethindrone-ethinyl estradiol (JUNEL FE 1/20) 1 mg-20 mcg (21)/75 mg (7) per tablet    DISCONTINUED: norethindrone-ethinyl estradiol (JUNEL FE 1/20) 1 mg-20 mcg (21)/75 mg (7) per tablet        Discussed trial of vaginal estrogen for vaginal dryness.   Continue GUMARO until annual exam.   Check hormone levels.   RTC for annual exam.           Eleni Alaniz MD  [facility]  06/26/2024

## 2024-07-08 ENCOUNTER — PATIENT MESSAGE (OUTPATIENT)
Dept: OBSTETRICS AND GYNECOLOGY | Facility: CLINIC | Age: 54
End: 2024-07-08
Payer: COMMERCIAL

## 2024-07-16 ENCOUNTER — PATIENT MESSAGE (OUTPATIENT)
Dept: FAMILY MEDICINE | Facility: CLINIC | Age: 54
End: 2024-07-16
Payer: COMMERCIAL

## 2024-07-17 ENCOUNTER — PATIENT MESSAGE (OUTPATIENT)
Dept: FAMILY MEDICINE | Facility: CLINIC | Age: 54
End: 2024-07-17
Payer: COMMERCIAL

## 2024-10-28 ENCOUNTER — PATIENT MESSAGE (OUTPATIENT)
Dept: OBSTETRICS AND GYNECOLOGY | Facility: CLINIC | Age: 54
End: 2024-10-28
Payer: COMMERCIAL

## 2024-10-28 ENCOUNTER — TELEPHONE (OUTPATIENT)
Dept: OBSTETRICS AND GYNECOLOGY | Facility: CLINIC | Age: 54
End: 2024-10-28
Payer: COMMERCIAL

## 2024-10-28 DIAGNOSIS — N30.00 ACUTE CYSTITIS WITHOUT HEMATURIA: Primary | ICD-10-CM

## 2024-10-28 DIAGNOSIS — R30.0 DYSURIA: Primary | ICD-10-CM

## 2024-10-28 RX ORDER — FLUCONAZOLE 200 MG/1
200 TABLET ORAL ONCE
Qty: 1 TABLET | Refills: 0 | Status: SHIPPED | OUTPATIENT
Start: 2024-10-28 | End: 2024-10-28

## 2024-10-28 RX ORDER — NITROFURANTOIN 25; 75 MG/1; MG/1
100 CAPSULE ORAL 2 TIMES DAILY
Qty: 14 CAPSULE | Refills: 0 | Status: SHIPPED | OUTPATIENT
Start: 2024-10-28 | End: 2024-11-04

## 2024-11-12 ENCOUNTER — PATIENT MESSAGE (OUTPATIENT)
Dept: OBSTETRICS AND GYNECOLOGY | Facility: CLINIC | Age: 54
End: 2024-11-12
Payer: COMMERCIAL

## 2024-11-12 ENCOUNTER — TELEPHONE (OUTPATIENT)
Dept: OBSTETRICS AND GYNECOLOGY | Facility: CLINIC | Age: 54
End: 2024-11-12
Payer: COMMERCIAL

## 2024-11-12 DIAGNOSIS — R30.0 DYSURIA: Primary | ICD-10-CM

## 2024-11-19 ENCOUNTER — OFFICE VISIT (OUTPATIENT)
Dept: OBSTETRICS AND GYNECOLOGY | Facility: CLINIC | Age: 54
End: 2024-11-19
Payer: COMMERCIAL

## 2024-11-19 VITALS
WEIGHT: 130.5 LBS | SYSTOLIC BLOOD PRESSURE: 110 MMHG | HEIGHT: 66 IN | BODY MASS INDEX: 20.97 KG/M2 | DIASTOLIC BLOOD PRESSURE: 72 MMHG

## 2024-11-19 DIAGNOSIS — Z01.419 ENCOUNTER FOR ANNUAL ROUTINE GYNECOLOGICAL EXAMINATION: Primary | ICD-10-CM

## 2024-11-19 DIAGNOSIS — Z12.31 BREAST CANCER SCREENING BY MAMMOGRAM: ICD-10-CM

## 2024-11-19 DIAGNOSIS — Z87.42 HISTORY OF OVARIAN CYST: ICD-10-CM

## 2024-11-19 DIAGNOSIS — Z87.440 HISTORY OF UTI: ICD-10-CM

## 2024-11-19 DIAGNOSIS — N89.8 VAGINAL DRYNESS: ICD-10-CM

## 2024-11-19 DIAGNOSIS — N95.1 PERIMENOPAUSE: ICD-10-CM

## 2024-11-19 DIAGNOSIS — R53.83 FATIGUE, UNSPECIFIED TYPE: ICD-10-CM

## 2024-11-19 PROCEDURE — 3008F BODY MASS INDEX DOCD: CPT | Mod: CPTII,S$GLB,, | Performed by: OBSTETRICS & GYNECOLOGY

## 2024-11-19 PROCEDURE — 3074F SYST BP LT 130 MM HG: CPT | Mod: CPTII,S$GLB,, | Performed by: OBSTETRICS & GYNECOLOGY

## 2024-11-19 PROCEDURE — 1159F MED LIST DOCD IN RCRD: CPT | Mod: CPTII,S$GLB,, | Performed by: OBSTETRICS & GYNECOLOGY

## 2024-11-19 PROCEDURE — 3078F DIAST BP <80 MM HG: CPT | Mod: CPTII,S$GLB,, | Performed by: OBSTETRICS & GYNECOLOGY

## 2024-11-19 PROCEDURE — 99396 PREV VISIT EST AGE 40-64: CPT | Mod: S$GLB,,, | Performed by: OBSTETRICS & GYNECOLOGY

## 2024-11-19 PROCEDURE — 99999 PR PBB SHADOW E&M-EST. PATIENT-LVL III: CPT | Mod: PBBFAC,,, | Performed by: OBSTETRICS & GYNECOLOGY

## 2024-11-19 PROCEDURE — 1160F RVW MEDS BY RX/DR IN RCRD: CPT | Mod: CPTII,S$GLB,, | Performed by: OBSTETRICS & GYNECOLOGY

## 2024-11-19 PROCEDURE — 87086 URINE CULTURE/COLONY COUNT: CPT | Performed by: OBSTETRICS & GYNECOLOGY

## 2024-11-19 RX ORDER — LINACLOTIDE 290 UG/1
CAPSULE, GELATIN COATED ORAL
COMMUNITY
Start: 2024-09-09

## 2024-11-19 RX ORDER — NORETHINDRONE ACETATE AND ETHINYL ESTRADIOL 1MG-20(21)
1 KIT ORAL DAILY
Qty: 84 TABLET | Refills: 4 | Status: CANCELLED | OUTPATIENT
Start: 2024-11-19 | End: 2025-11-19

## 2024-11-19 RX ORDER — ESTRADIOL 10 UG/1
INSERT VAGINAL
COMMUNITY
Start: 2024-10-14

## 2024-11-19 RX ORDER — ESTRADIOL 10 UG/1
10 INSERT VAGINAL DAILY
Qty: 14 EACH | Refills: 0 | Status: SHIPPED | OUTPATIENT
Start: 2024-11-19 | End: 2024-12-03

## 2024-11-19 RX ORDER — PRASTERONE 6.5 MG/1
6.5 INSERT VAGINAL
Status: CANCELLED | OUTPATIENT
Start: 2024-11-19

## 2024-11-19 NOTE — PROGRESS NOTES
Chief Complaint: Well Woman Exam     HPI:      Mandi Juan is a 54 y.o.  who presents today for well woman exam.  LMP: Patient's last menstrual period was 10/12/2024.  Ms. Juan is currently sexually active with a single male partner. She is currently using no method for contraception. She declines STD screening today.    Discontinued OCP in October.  Reports one menses immediately after discontinuing; no menses since.  Denies VMS. Continues to report moderate to severe fatigue and mild night sweats. Taking daily Vitamin D and had recent level check that was normal.    Also continues to report vaginal dryness.  Had previously used Imvexxy x a few months but did not report significant improvement in symptoms and discontinued.     Had recent UTI with intermittent UTI symptoms that are persistent.     Previous Pap:  no abnormalities (10/26/2022)  Previous Mammogram:   Results for orders placed during the hospital encounter of 23    Mammo Digital Screening Bilat w/ Chris    Narrative  Result:  Mammo Digital Screening Bilat w/ Chris    History:  Patient is 53 y.o. and is seen for a screening mammogram.      Films Compared:  Prior images (if available) were compared.    Findings:  This procedure was performed using tomosynthesis.  Computer-aided detection was utilized in the interpretation of this examination.    The breasts are heterogeneously dense, which may obscure small masses. There is no evidence of suspicious masses, microcalcifications or architectural distortion.    Impression  No mammographic evidence of malignancy.    BI-RADS Category 1: Negative    Recommendation:  Routine screening mammogram in 1 year is recommended.    Your estimated lifetime risk of breast cancer (to age 85) based on Tyrer-Cuzick risk assessment model is 10.32 %.  According to the [employer] Society, patients with a lifetime breast cancer risk of 20% or higher might benefit from supplemental screening tests, such as  "screening breast MRI.     Most Recent Dexa: not indicated  Colonoscopy: 2018    COVID vaccine: completed series  Gardasil:has never had     Patient Active Problem List   Diagnosis    GERD (gastroesophageal reflux disease)    Migraines    Irritable bowel syndrome with constipation    Meningocele spinal    Palpitations    Fatigue       Past Medical History:   Diagnosis Date    Abnormal uterine bleeding 21    Anxiety     BRCA1 negative     BRCA2 negative     COVID-19     Dyspareunia     20 years    Endometriosis of uterus     15-20 years    GERD (gastroesophageal reflux disease)     Internal hemorrhoids     Irritable bowel syndrome     Migraines     Palpitations     UTI (urinary tract infection)        Past Surgical History:   Procedure Laterality Date     SECTION      COLONOSCOPY      hemorrhoid banding  2022    TONSILLECTOMY         OB History          2    Para   2    Term   2            AB        Living   3         SAB        IAB        Ectopic        Multiple   1    Live Births   2           Obstetric Comments     No abnormal pap  No STDs   x 1, CD x 1  (TIUP 37 weeks)               ROS:     Review of Systems   Constitutional:  Negative for activity change, fatigue and unexpected weight change.   Respiratory:  Negative for shortness of breath.    Cardiovascular:  Negative for chest pain.   Gastrointestinal:  Negative for abdominal pain, blood in stool, constipation and diarrhea.   Endocrine: Negative for cold intolerance and heat intolerance.   Genitourinary:  Negative for bladder incontinence, dyspareunia, dysuria, frequency, hot flashes, vaginal bleeding and vaginal dryness.   Integumentary:  Negative for breast mass, breast discharge and breast tenderness.   Psychiatric/Behavioral:  Negative for dysphoric mood. The patient is not nervous/anxious.    Breast: Negative for mass and tenderness      Physical Exam:      PHYSICAL EXAM:  /72   Ht 5' 6" (1.676 m)   Wt " 59.2 kg (130 lb 8.2 oz)   LMP 10/12/2024   BMI 21.07 kg/m²   Body mass index is 21.07 kg/m².     APPEARANCE: Well nourished, well developed, in no acute distress.  PSYCH: Appropriate mood and affect.  SKIN: No acne or hirsutism  NECK: Neck symmetric without masses or thyromegaly  NODES: No inguinal, axillary, or supraclavicular lymph node enlargement  ABDOMEN: Soft.  No tenderness or masses.    CARDIOVASCULAR: No edema of peripheral extremities  BREASTS: Symmetrical, no skin changes or visible lesions.  No palpable masses or nipple discharge bilaterally.  PELVIC: Normal external genitalia without lesions.  Normal hair distribution.  Adequate perineal body, normal urethral meatus.  Vagina moist and well rugated without lesions or discharge.  Cervix pink, without lesions, discharge or tenderness.  No significant cystocele or rectocele.  Bimanual exam shows uterus to be normal size, regular, mobile and nontender.  Adnexa without masses or tenderness.      Assessment:     1. Encounter for annual routine gynecological examination        2. Breast cancer screening by mammogram  Mammo Digital Screening Bilat w/ Chris      3. History of ovarian cyst        4. Perimenopause        5. Vaginal dryness        6. Fatigue, unspecified type  CBC Auto Differential    FERRITIN      7. History of UTI  CULTURE, URINE            Plan:     Clinical breast exam performed.  Pap UTD.  Mammogram ordered.  DEXA at 64.  Colonoscopy UTD.  Contraception: none.  Fatigue: CBC, ferritin.  TSH reviewed. Vit D normal per patient, continue supplement.   Perimenopause: reviewed warning signs and when to notify clinic.  Restart Imvexxy.    Follow up in about 1 year (around 11/19/2025) for annual well woman exam or as needed.    Counseling:     Patient was counseled today on current ASCCP pap guidelines, the recommendation for yearly pelvic exams, healthy diet and exercise routines, breast self awareness and annual mammograms.She is to see her PCP for  other health maintenance.     Use of the [facility] Patient Portal discussed and encouraged during today's visit.         Eleni Alaniz MD  [facility]  11/19/2024

## 2024-11-21 LAB — BACTERIA UR CULT: NORMAL

## 2025-04-29 ENCOUNTER — OFFICE VISIT (OUTPATIENT)
Dept: FAMILY MEDICINE | Facility: CLINIC | Age: 55
End: 2025-04-29
Payer: COMMERCIAL

## 2025-04-29 VITALS
OXYGEN SATURATION: 99 % | DIASTOLIC BLOOD PRESSURE: 68 MMHG | HEART RATE: 77 BPM | SYSTOLIC BLOOD PRESSURE: 112 MMHG | TEMPERATURE: 99 F | HEIGHT: 66 IN | WEIGHT: 131.5 LBS | BODY MASS INDEX: 21.13 KG/M2

## 2025-04-29 DIAGNOSIS — M62.838 NECK MUSCLE SPASM: ICD-10-CM

## 2025-04-29 DIAGNOSIS — R00.2 PALPITATIONS: ICD-10-CM

## 2025-04-29 DIAGNOSIS — R79.89 HIGH SERUM VITAMIN D: ICD-10-CM

## 2025-04-29 DIAGNOSIS — G43.019 INTRACTABLE MIGRAINE WITHOUT AURA AND WITHOUT STATUS MIGRAINOSUS: Primary | ICD-10-CM

## 2025-04-29 DIAGNOSIS — Z87.42 HISTORY OF OVARIAN CYST: ICD-10-CM

## 2025-04-29 DIAGNOSIS — N95.1 MENOPAUSAL SYMPTOMS: ICD-10-CM

## 2025-04-29 PROCEDURE — 99214 OFFICE O/P EST MOD 30 MIN: CPT | Mod: S$GLB,,, | Performed by: STUDENT IN AN ORGANIZED HEALTH CARE EDUCATION/TRAINING PROGRAM

## 2025-04-29 PROCEDURE — 3078F DIAST BP <80 MM HG: CPT | Mod: CPTII,S$GLB,, | Performed by: STUDENT IN AN ORGANIZED HEALTH CARE EDUCATION/TRAINING PROGRAM

## 2025-04-29 PROCEDURE — 1160F RVW MEDS BY RX/DR IN RCRD: CPT | Mod: CPTII,S$GLB,, | Performed by: STUDENT IN AN ORGANIZED HEALTH CARE EDUCATION/TRAINING PROGRAM

## 2025-04-29 PROCEDURE — 1159F MED LIST DOCD IN RCRD: CPT | Mod: CPTII,S$GLB,, | Performed by: STUDENT IN AN ORGANIZED HEALTH CARE EDUCATION/TRAINING PROGRAM

## 2025-04-29 PROCEDURE — 3074F SYST BP LT 130 MM HG: CPT | Mod: CPTII,S$GLB,, | Performed by: STUDENT IN AN ORGANIZED HEALTH CARE EDUCATION/TRAINING PROGRAM

## 2025-04-29 PROCEDURE — 3008F BODY MASS INDEX DOCD: CPT | Mod: CPTII,S$GLB,, | Performed by: STUDENT IN AN ORGANIZED HEALTH CARE EDUCATION/TRAINING PROGRAM

## 2025-04-29 PROCEDURE — 99999 PR PBB SHADOW E&M-EST. PATIENT-LVL IV: CPT | Mod: PBBFAC,,, | Performed by: STUDENT IN AN ORGANIZED HEALTH CARE EDUCATION/TRAINING PROGRAM

## 2025-04-29 RX ORDER — TIZANIDINE 4 MG/1
4 TABLET ORAL NIGHTLY PRN
Qty: 60 TABLET | Refills: 2 | Status: SHIPPED | OUTPATIENT
Start: 2025-04-29

## 2025-04-29 RX ORDER — UBROGEPANT 100 MG/1
100 TABLET ORAL
Qty: 16 TABLET | Refills: 2 | Status: SHIPPED | OUTPATIENT
Start: 2025-04-29

## 2025-04-29 NOTE — PROGRESS NOTES
Subjective:      Patient ID: Mandi Juan is a 54 y.o. female.    Chief Complaint: Headache    History of Present Illness    CHIEF COMPLAINT:  Patient presents today for worsening migraine headaches.    MIGRAINE HISTORY:  She has a history of migraines previously managed by Dr. Davila. Current episode began Wednesday with intermittent but manageable headache, progressing to dizziness on Sunday. Yesterday, she experienced excruciating neck pain with head spasms. Today, she reports residual hangover-type headache with mild lightheadedness and associated nausea. She has history of adverse reactions to Imitrex and Maxalt, including tachycardia and depression. She currently takes Verapamil for palpitations, which has served as a preventative medication for migraines.    GYNECOLOGIC HISTORY:  She discontinued birth control in September and has not had periods since. She is experiencing menopausal symptoms including hot flashes and severe fatigue by the end of the day. She has a history of ovarian cysts previously managed with birth control. Previous genetic testing for breast cancer risk was performed due to history of cysts and a uterine abnormality requiring biopsy, with favorable results.    CURRENT MEDICATIONS:  She takes Verapamil for palpitations and Vitamin D 5000 IU daily.      ROS:  General: -fever, -chills, +fatigue, -weight gain, -weight loss  Eyes: -vision changes, -redness, -discharge  ENT: -ear pain, -nasal congestion, -sore throat, +ear pressure  Cardiovascular: -chest pain, +palpitations, -lower extremity edema  Respiratory: -cough, -shortness of breath  Gastrointestinal: -abdominal pain, +nausea, -vomiting, -diarrhea, -constipation, -blood in stool  Genitourinary: -dysuria, -hematuria, -frequency  Musculoskeletal: -joint pain, -muscle pain, +neck stiffness, +muscle spasms, +neck pain  Skin: -rash, -lesion  Neurological: +headache, +dizziness, -numbness, -tingling, +migraines  Psychiatric: -anxiety,  -depression, -sleep difficulty  Endocrine: +hot flashes          Objective:     Vitals:    04/29/25 1108   BP: 112/68   Pulse: 77   Temp: 99 °F (37.2 °C)      Physical Exam  Vitals reviewed.   Constitutional:       Appearance: Normal appearance. She is normal weight.   HENT:      Head: Normocephalic and atraumatic.   Eyes:      Conjunctiva/sclera: Conjunctivae normal.   Cardiovascular:      Rate and Rhythm: Normal rate and regular rhythm.      Heart sounds: Normal heart sounds.   Pulmonary:      Effort: Pulmonary effort is normal.      Breath sounds: Normal breath sounds.   Abdominal:      Palpations: Abdomen is soft.      Tenderness: There is no abdominal tenderness.   Musculoskeletal:      Right shoulder: Tenderness present. Decreased range of motion.      Cervical back: Normal range of motion. Spasms and tenderness present.        Back:       Right lower leg: No edema.      Left lower leg: No edema.   Neurological:      Mental Status: She is alert. Mental status is at baseline.   Psychiatric:         Mood and Affect: Mood normal.         Behavior: Behavior normal.        Assessment:         1. Intractable migraine without aura and without status migrainosus    2. High serum vitamin D    3. Menopausal symptoms    4. History of ovarian cyst    5. Palpitations    6. Neck muscle spasm          Plan:   Assessment & Plan      IMPRESSION:  - Considered hormone-related factors in recent increase in migraine frequency, given approaching menopause.  - Assessed history of migraines, noting previous intolerance to triptans due to side effects.  - Evaluated current episode's characteristics, including neck involvement and dizziness.  - Determined need for both abortive and preventive treatments given recent significant episode and ongoing symptoms.  - Reviewed recent bloodwork, noting elevated Vitamin D levels.    MIGRAINE:  - Patient reports history of migraines that have improved over time but recently experienced a severe  episode lasting several days with associated symptoms including dizziness, neck pain, and nausea.  - Patient's son, a physical therapist, assisted with exam and did not observe signs of vertigo.  - Explained that muscle tightness in the ear area could contribute to the sensation of ear blockage during migraines.  - Prescribed new abortive migraine medication with different mechanism than triptans to avoid previous side effects, to be taken as needed for migraine attacks.    CERVICALGIA (NECK PAIN):  - Physical exam revealed tightness in the neck area associated with recent migraine episode.  - Prescribed muscle relaxant for acute relief of neck spasms, to be taken at bedtime or when at home due to sedating effects.    ADVERSE EFFECTS OF MEDICATIONS:  - Patient reports previous adverse effects from migraine medications TRIPTANS, including heart racing and depression-like feelings.  - New abortive migraine medication prescribed specifically to avoid these previous adverse effects.    PALPITATIONS:  - Patient is currently on Verapamil for palpitations.  - Will continue this medication as a preventative measure for management of palpitations.    MENOPAUSAL AND PERIMENOPAUSAL DISORDERS:  - Patient reports being in perimenopause, experiencing hot flashes and extreme fatigue.  - Discussed potential benefits of hormone replacement therapy for menopausal symptoms, emphasizing importance of regular mammograms to monitor breast cancer risk.  - Recommend contacting gynecologist to discuss starting hormone replacement therapy sooner than planned if symptoms are significantly affecting quality of life.  - Advised to contact the office prior to October if perimenopausal symptoms are significantly impacting daily functioning.    PERSONAL HISTORY OF BENIGN NEOPLASM:  - Patient reports history of ovarian cysts previously managed with birth control pills, and mentions history of uterine growth that required biopsy.    VITAMIN D  MANAGEMENT:  - Recent labs shows patient's Vitamin D levels are too high.  - Patient reports taking high doses as prescribed by Dr. Warren.  - Instructed to discontinue vitamin D supplementation for 2 weeks, then restart at 1,000 IU daily.        Problem List Items Addressed This Visit          Neuro    Migraines - Primary    Overview   Stable  Follows with neuro  Verapamil 180 daily          Relevant Medications    tiZANidine (ZANAFLEX) 4 MG tablet    ubrogepant (UBRELVY) 100 mg tablet       Cardiac/Vascular    Palpitations    Overview   Managed per Shabnam vizcaino flecainide   Comes and goes           Other Visit Diagnoses         High serum vitamin D          Menopausal symptoms          History of ovarian cyst          Neck muscle spasm        Relevant Medications    tiZANidine (ZANAFLEX) 4 MG tablet                    Ericka Jackman   [facility]   4/29/25       This note was generated with the assistance of ambient listening technology. Verbal consent was obtained by the patient and accompanying visitor(s) for the recording of patient appointment to facilitate this note. I attest to having reviewed and edited the generated note for accuracy, though some syntax or spelling errors may persist. Please contact the author of this note for any clarification.

## 2025-05-01 ENCOUNTER — PATIENT MESSAGE (OUTPATIENT)
Dept: FAMILY MEDICINE | Facility: CLINIC | Age: 55
End: 2025-05-01
Payer: COMMERCIAL

## 2025-05-01 DIAGNOSIS — G43.809 OTHER MIGRAINE WITHOUT STATUS MIGRAINOSUS, NOT INTRACTABLE: Primary | ICD-10-CM

## 2025-05-08 ENCOUNTER — OFFICE VISIT (OUTPATIENT)
Dept: NEUROLOGY | Facility: CLINIC | Age: 55
End: 2025-05-08
Payer: COMMERCIAL

## 2025-05-08 VITALS
RESPIRATION RATE: 16 BRPM | HEART RATE: 74 BPM | BODY MASS INDEX: 21.08 KG/M2 | WEIGHT: 131.19 LBS | SYSTOLIC BLOOD PRESSURE: 104 MMHG | HEIGHT: 66 IN | OXYGEN SATURATION: 98 % | DIASTOLIC BLOOD PRESSURE: 62 MMHG

## 2025-05-08 DIAGNOSIS — Q05.9 MENINGOCELE SPINAL: ICD-10-CM

## 2025-05-08 DIAGNOSIS — R51.9 WORSENING HEADACHES: ICD-10-CM

## 2025-05-08 DIAGNOSIS — G43.809 OTHER MIGRAINE WITHOUT STATUS MIGRAINOSUS, NOT INTRACTABLE: ICD-10-CM

## 2025-05-08 DIAGNOSIS — G43.E19 INTRACTABLE CHRONIC MIGRAINE WITH AURA AND WITHOUT STATUS MIGRAINOSUS: Primary | ICD-10-CM

## 2025-05-08 PROCEDURE — 99999 PR PBB SHADOW E&M-EST. PATIENT-LVL V: CPT | Mod: PBBFAC,,, | Performed by: FAMILY MEDICINE

## 2025-05-08 NOTE — PROGRESS NOTES
"  HPI: 54 y.o. female with chronic migraines with aura, who presents to clinic alone for evaluation of headaches.     Headaches are described as throbbing pain located occipitally and radiates forward to her eye.  Headaches are lessened when treated with ibuprofen, but can last up to 3-4 days in duration.  The symptoms start  with aura of photopsias and blurred vision. Associated symptoms include nausea.  Currently experiencing migraines occurring on 15/30 days.      2 weeks ago, started with a slight headache. Manageable for the first 4 days. Started getting dizzy after that. Bad headache that caused her to have trouble holding her head up. Spasms in her head. Missed work. Layed on ice. PCP: muscle relaxer and ubrelvy. Excruciating pain. Headache still persistent through muscle relaxer and ubrelvy. Son is a PT and and has been helping her with neck stretches. She feels like this was helpful. Sometime after this, she had what she feels like was a "typical migraine."    Follows [facility]: Saw them last month and was everything was good.     Routine eye exams yearly and sees a retina specialist.    Head Trauma-Denies, Recent Infection-Denies, Fever-Denies, Cancer--Denies    Denies Hx of  asthma, GI bleed, osteoporosis, CAD/MI, CVA/TIA, DM     Positive history of Kidney Stones    FAMILY HISTORY:  Migraines: yes  Aneurysms: Uncle  Brain tumors: Denies      TREATMENTS TRIED:  Verapamil  Diclofenac  zoloft  Fioricet  Compazine  Maxalt-could not tolerate  Relpax-could not tolerate  Imitrex-could not tolerate  Ubrelvy  Toradol-failed        Review of Systems   Constitutional:  Negative for chills and fever.   HENT:  Negative for hearing loss.    Eyes:  Positive for blurred vision.   Respiratory:  Negative for cough.    Cardiovascular:  Positive for palpitations. Negative for chest pain.   Gastrointestinal:  Positive for constipation and nausea. Negative for heartburn.   Genitourinary:  Negative for dysuria.   Musculoskeletal:  " Negative for myalgias.   Skin:  Negative for rash.   Neurological:  Positive for headaches. Negative for dizziness.   Endo/Heme/Allergies:  Does not bruise/bleed easily.   Psychiatric/Behavioral:  Negative for depression.          I have reviewed all of this patient's past medical and surgical histories as well as family and social histories and active allergies and medications as documented in the electronic medical record.      PHYSICAL EXAM:  Gen Appearance, well developed/nourished in no apparent distress  CV: 2+ distal pulses with no edema or swelling  Neuro:  MS: Awake, alert, oriented to place, person, time, situation. Sustains attention. Recent/remote memory intact, Language is full to spontaneous speech/repetition/naming/comprehension. Fund of Knowledge is full  CN:  PERRL, Extraoccular movements and visual fields are full. Normal facial sensation and strength, Hearing symmetric, Tongue and Palate are midline and strong. Shoulder Shrug symmetric and strong.  Motor: Normal bulk, tone, no abnormal movements. 5/5 strength bilateral upper/lower extremities with 2+ reflexes   Sensory: symmetric to light touch, pain, temp, and vibration/proprioception. Romberg negative  Cerebellar: Finger-nose, Heal-shin, Rapid alternating movements intact  Gait: Normal stance, no ataxia    IMAGING:  10/2018 CTA brain: No acute abnormality. No high-grade stenosis or major vessel occlusion.     6/2019 Xray L spine: There is no evidence of an acute fracture.  No focal disk space narrowing.  Align no evidence of spondylolysis or spondylolisthesis       LABS: 4/2025: Vit D, Thyroid panel, lipid, CMP, CBC, Iron/TIBC, B12/folate       ASSESSMENT/PLAN: Mandi Juan is a 54 y.o. female with chronic migraines here for worsening of migraines with recent episode that lasted about 2 weeks.    I recommend:   1. CTA head unremarkable 2018  2. MRI head considering worsening headache  3. She continues verapamil for heart flutter which may be  acting like a good prevention medication.   -does not currently need further prevention medications  4. F/u with cardiology considering age >50 and worsening headaches  5. Start Botox per migraine protocol as it is therapeutically necessary to manage her chronic migraines.   The patient has chronic migraines (G43.719) and suffers from headaches more than 15 days a month lasting more than 4 hours a day with no relief of symptoms despite trying multiple medications including but not limited to above list.  The patient will be an ideal candidate for Botox. We are planning for 3 treatments 12 weeks apart and aiming for at least 50% improvement in the symptoms. If we see no improvement after 3 treatments, we will discontinue the injections.     Planned Injection Sites:   muscle bilaterally ( a total of 10 units divided into 2 sites)   Procerus muscle (5 units)   Frontalis muscle bilaterally (a total of 20 units divided into 4 sites)   Temporalis muscle bilaterally (a total of 40 units divided into 8 sites)   Occipitalis muscle bilaterally (a total of 30 units divided into 6 sites)   Cervical paraspinal muscles (a total of 20 units divided into 4 sites)   Trapezius muscle bilaterally (a total of 30 units divided into 6 sites)      There is an unavoidable waste of 45 units with botox administration as botox is only supplied in 100u and 200u vials.   -not a good candidate for topamax; kidney stones  -already on antihypertensive agent; avoid BB  6. Continue with ubrelvy for PRN use  7. Continue with PT neck stretching  8.She  had tingling and running sensation down her right leg with radicular pain in 2019. Xray L spine unremarkable in 2019  -She has a known meningocele at the sacral level followed by Dr Wynne at  prior.  -She eventually had relief with dry needling of her piriformis muscle and symptoms are resolved and she does have some chronic tailbone pain.   -Could consult pain management PRN for any  worsening symptoms      I have discussed realistic goals of care with patient at length as well as medication options, and need for lifestyle adjustment. I have explained that treatment will take time. We have agreed that the goal will be to reduce frequency/intensity/quantity of HA, not to be completely HA free. I have explained my non narcotic policy regarding headache treatment.  Patient to track frequency of headaches.Patient agreeable to work on lifestyle adjustments. Questions and concerns were sought and answered to the patient's stated verbal satisfaction.  The patient verbalizes understanding and agreement with the above stated treatment plan.    50 minutes of total time spent on the encounter.   This includes face to face time and non-face to face time preparing to see the patient (eg, review of tests), obtaining and/or reviewing separately obtained history, documenting clinical information in the electronic or other health record, independently interpreting results and communicating results to the patient/family/caregiver, or care coordinator.    Visit today included increased complexity associated with the care of the episodic problem migraines addressed and managing the longitudinal care of the patient due to the serious and/or complex managed problem(s).       Return to clinic in 2 weeks for botox    CC: Ericka Jackman DO

## 2025-05-15 ENCOUNTER — RESULTS FOLLOW-UP (OUTPATIENT)
Dept: NEUROLOGY | Facility: CLINIC | Age: 55
End: 2025-05-15

## 2025-05-22 ENCOUNTER — PROCEDURE VISIT (OUTPATIENT)
Dept: NEUROLOGY | Facility: CLINIC | Age: 55
End: 2025-05-22
Payer: COMMERCIAL

## 2025-05-22 DIAGNOSIS — G43.E19 INTRACTABLE CHRONIC MIGRAINE WITH AURA AND WITHOUT STATUS MIGRAINOSUS: Primary | ICD-10-CM

## 2025-05-22 NOTE — PROCEDURES
BOTOX PROCEDURE NOTE     Date of Procedure: 05/22/2025      Reason for Proceedure: Chronic Migraine     Botox injections are being administered today for the prevention of chronic migraines, as Botox remains effective in reducing the frequency, severity, and duration of patient's migraines, which occurred greater than 15 days per month and lasted greater than several hours each day prior to receiving Botox injections.     Informed consent was obtained prior to performing this study. Two patient identifiers were confirmed with the patient prior to performing this study. A time out to determine correct patient and and agreement on procedure performed was conducted prior to the injections.     Procedure Details: After informed consent obtained the patient's head and upper neck was cleansed with alcohol rub and 155 Units of Botox (diluted 1:1) was injected in the following bilateral muscles:   10 units in corregator* (over 2 sites), 5 units in Procerus, 20 units Frontalis* (over 4 sites), 40 units in Temporalis* (over 4 sites), 30 units in occipitalis* (over 6 sites), 20 units in the cervical paraspinal muscles* (over 4 sites), and 30 units in Trapezius* (over 4 sites). The remaining 45 units were wasted to follow recommended guidelines for treatment of chonic migraines with Botox   *= denotes bilateral injections    There is an unavoidable waste of 45 units with Botox administration, as Botox is only supplied in 100 unit and 200 unit vials.     The patient tolerated the procedure well with no more than 1cc of blood loss. He was observed for several minutes post injection and given a handout from [employer] regarding when and where to seek help if side effects are experienced.       I recommend:   1. CTA head unremarkable 2018  2. MRI head 5/2025 normal  3. She continues verapamil for heart flutter which may be acting like a good prevention medication.   -does not currently need further prevention medications  4. F/u with  cardiology considering age >50 and worsening headaches  5. Start Botox per migraine protocol as it is therapeutically necessary to manage her chronic migraines.   -not a good candidate for topamax; kidney stones  -already on antihypertensive agent; avoid BB  6. Continue with ubrelvy for PRN use  7. Continue with PT neck stretching  8.She  had tingling and running sensation down her right leg with radicular pain in 2019. Xray L spine unremarkable in 2019  -She has a known meningocele at the sacral level followed by Dr Wynne at  prior.  -She eventually had relief with dry needling of her piriformis muscle and symptoms are resolved and she does have some chronic tailbone pain.   -Could consult pain management PRN for any worsening symptoms

## 2025-06-30 NOTE — PROGRESS NOTES
"  HPI: 54 y.o. female with chronic migraines with aura, who presents to clinic alone for evaluation of headaches.     She is here today to follow-up after initial administration of Botox per Chronic Migraine Protocol. She reports headaches are less severe and less frequency. She has only needed to take iburpfen for her headaches which was effective at aborting them. She has not needed to use a muscle relaxer or ice as she did previously.         Headaches are described as throbbing pain located occipitally and radiates forward to her eye.  Headaches are lessened when treated with ibuprofen, but can last up to 3-4 days in duration.  The symptoms start  with aura of photopsias and blurred vision. Associated symptoms include nausea.  Currently experiencing migraines occurring on 15/30 days.      2 weeks ago, started with a slight headache. Manageable for the first 4 days. Started getting dizzy after that. Bad headache that caused her to have trouble holding her head up. Spasms in her head. Missed work. Layed on ice. PCP: muscle relaxer and ubrelvy. Excruciating pain. Headache still persistent through muscle relaxer and ubrelvy. Son is a PT and and has been helping her with neck stretches. She feels like this was helpful. Sometime after this, she had what she feels like was a "typical migraine."    Follows [facility]: Saw them last month and was everything was good.     Routine eye exams yearly and sees a retina specialist.    Head Trauma-Denies, Recent Infection-Denies, Fever-Denies, Cancer--Denies    Denies Hx of  asthma, GI bleed, osteoporosis, CAD/MI, CVA/TIA, DM     Positive history of Kidney Stones    FAMILY HISTORY:  Migraines: yes  Aneurysms: Uncle  Brain tumors: Denies      TREATMENTS TRIED:  Verapamil  Diclofenac  zoloft  Fioricet  Compazine  Maxalt-could not tolerate  Relpax-could not tolerate  Imitrex-could not tolerate  Ubrelvy  Toradol-failed        Review of Systems   Constitutional:  Negative for chills and " fever.   HENT:  Negative for hearing loss.    Eyes:  Positive for blurred vision.   Respiratory:  Negative for cough.    Cardiovascular:  Positive for palpitations. Negative for chest pain.   Gastrointestinal:  Positive for constipation and nausea. Negative for heartburn.   Genitourinary:  Negative for dysuria.   Musculoskeletal:  Negative for myalgias.   Skin:  Negative for rash.   Neurological:  Positive for headaches. Negative for dizziness.   Endo/Heme/Allergies:  Does not bruise/bleed easily.   Psychiatric/Behavioral:  Negative for depression.          I have reviewed all of this patient's past medical and surgical histories as well as family and social histories and active allergies and medications as documented in the electronic medical record.      PHYSICAL EXAM:  Gen Appearance, well developed/nourished in no apparent distress  CV: 2+ distal pulses with no edema or swelling  Neuro:  MS: Awake, alert, oriented to place, person, time, situation. Sustains attention. Recent/remote memory intact, Language is full to spontaneous speech/repetition/naming/comprehension. Fund of Knowledge is full  CN:  PERRL, Extraoccular movements and visual fields are full. Normal facial sensation and strength, Hearing symmetric, Tongue and Palate are midline and strong. Shoulder Shrug symmetric and strong.  Motor: Normal bulk, tone, no abnormal movements. 5/5 strength bilateral upper/lower extremities with 2+ reflexes   Sensory: symmetric to light touch, pain, temp, and vibration/proprioception. Romberg negative  Cerebellar: Finger-nose, Heal-shin, Rapid alternating movements intact  Gait: Normal stance, no ataxia    IMAGING:  10/2018 CTA brain: No acute abnormality. No high-grade stenosis or major vessel occlusion.     6/2019 Xray L spine: There is no evidence of an acute fracture.  No focal disk space narrowing.  Align no evidence of spondylolysis or spondylolisthesis     MRI head (5/2025): No evidence of acute intracranial  pathology.          LABS: 4/2025: Vit D, Thyroid panel, lipid, CMP, CBC, Iron/TIBC, B12/folate       ASSESSMENT/PLAN: Mandi Juan is a 54 y.o. female with chronic migraines here for worsening of migraines with recent episode that lasted about 2 weeks.    I recommend:   1. CTA head unremarkable 2018  2. MRI head unremarkable 2025  3. She continues verapamil for heart flutter which may be acting like a good prevention medication.   -does not currently need further prevention medications  4. F/u with cardiology considering age >50 and worsening headaches  5. Continue Botox per migraine protocol as it is therapeutically necessary to manage her chronic migraines.   The patient has chronic migraines (G43.719) and suffers from headaches more than 15 days a month lasting more than 4 hours a day with no relief of symptoms despite trying multiple medications including but not limited to above list.  The patient will be an ideal candidate for Botox. We are planning for 3 treatments 12 weeks apart and aiming for at least 50% improvement in the symptoms. If we see no improvement after 3 treatments, we will discontinue the injections.     Planned Injection Sites:   muscle bilaterally ( a total of 10 units divided into 2 sites)   Procerus muscle (5 units)   Frontalis muscle bilaterally (a total of 20 units divided into 4 sites)   Temporalis muscle bilaterally (a total of 40 units divided into 8 sites)   Occipitalis muscle bilaterally (a total of 30 units divided into 6 sites)   Cervical paraspinal muscles (a total of 20 units divided into 4 sites)   Trapezius muscle bilaterally (a total of 30 units divided into 6 sites)      There is an unavoidable waste of 45 units with botox administration as botox is only supplied in 100u and 200u vials.   -not a good candidate for topamax; kidney stones  -already on antihypertensive agent; avoid BB  6. Continue with ubrelvy for PRN use  7. Continue with PT neck stretching  8.She   had tingling and running sensation down her right leg with radicular pain in 2019. Xray L spine unremarkable in 2019  -She has a known meningocele at the sacral level followed by Dr Wynne at  prior.  -She eventually had relief with dry needling of her piriformis muscle and symptoms are resolved and she does have some chronic tailbone pain.   -Could consult pain management PRN for any worsening symptoms      I have discussed realistic goals of care with patient at length as well as medication options, and need for lifestyle adjustment. I have explained that treatment will take time. We have agreed that the goal will be to reduce frequency/intensity/quantity of HA, not to be completely HA free. I have explained my non narcotic policy regarding headache treatment.  Patient to track frequency of headaches.Patient agreeable to work on lifestyle adjustments. Questions and concerns were sought and answered to the patient's stated verbal satisfaction.  The patient verbalizes understanding and agreement with the above stated treatment plan.    50 minutes of total time spent on the encounter.   This includes face to face time and non-face to face time preparing to see the patient (eg, review of tests), obtaining and/or reviewing separately obtained history, documenting clinical information in the electronic or other health record, independently interpreting results and communicating results to the patient/family/caregiver, or care coordinator.    Visit today included increased complexity associated with the care of the episodic problem migraines addressed and managing the longitudinal care of the patient due to the serious and/or complex managed problem(s).       Return to clinic in 6 weeks for botox    CC: Ericka Jackman DO

## 2025-07-03 ENCOUNTER — OFFICE VISIT (OUTPATIENT)
Dept: NEUROLOGY | Facility: CLINIC | Age: 55
End: 2025-07-03
Payer: COMMERCIAL

## 2025-07-03 VITALS
BODY MASS INDEX: 20.99 KG/M2 | HEART RATE: 71 BPM | WEIGHT: 130.63 LBS | OXYGEN SATURATION: 99 % | RESPIRATION RATE: 16 BRPM | SYSTOLIC BLOOD PRESSURE: 106 MMHG | HEIGHT: 66 IN | DIASTOLIC BLOOD PRESSURE: 72 MMHG

## 2025-07-03 DIAGNOSIS — G43.E19 INTRACTABLE CHRONIC MIGRAINE WITH AURA AND WITHOUT STATUS MIGRAINOSUS: Primary | ICD-10-CM

## 2025-07-03 PROCEDURE — 3078F DIAST BP <80 MM HG: CPT | Mod: CPTII,S$GLB,, | Performed by: FAMILY MEDICINE

## 2025-07-03 PROCEDURE — 99999 PR PBB SHADOW E&M-EST. PATIENT-LVL III: CPT | Mod: PBBFAC,,, | Performed by: FAMILY MEDICINE

## 2025-07-03 PROCEDURE — 3008F BODY MASS INDEX DOCD: CPT | Mod: CPTII,S$GLB,, | Performed by: FAMILY MEDICINE

## 2025-07-03 PROCEDURE — 99213 OFFICE O/P EST LOW 20 MIN: CPT | Mod: S$GLB,,, | Performed by: FAMILY MEDICINE

## 2025-07-03 PROCEDURE — G2211 COMPLEX E/M VISIT ADD ON: HCPCS | Mod: S$GLB,,, | Performed by: FAMILY MEDICINE

## 2025-07-03 PROCEDURE — 1160F RVW MEDS BY RX/DR IN RCRD: CPT | Mod: CPTII,S$GLB,, | Performed by: FAMILY MEDICINE

## 2025-07-03 PROCEDURE — 1159F MED LIST DOCD IN RCRD: CPT | Mod: CPTII,S$GLB,, | Performed by: FAMILY MEDICINE

## 2025-07-03 PROCEDURE — 3074F SYST BP LT 130 MM HG: CPT | Mod: CPTII,S$GLB,, | Performed by: FAMILY MEDICINE

## 2025-07-15 ENCOUNTER — PATIENT MESSAGE (OUTPATIENT)
Dept: OBSTETRICS AND GYNECOLOGY | Facility: CLINIC | Age: 55
End: 2025-07-15
Payer: COMMERCIAL

## 2025-07-17 ENCOUNTER — TELEPHONE (OUTPATIENT)
Dept: OBSTETRICS AND GYNECOLOGY | Facility: CLINIC | Age: 55
End: 2025-07-17
Payer: COMMERCIAL

## 2025-07-17 NOTE — TELEPHONE ENCOUNTER
Called to get patient scheduled for a virtual visit to speak about perimenopause symptoms. No answer. Left message.

## 2025-07-23 ENCOUNTER — OFFICE VISIT (OUTPATIENT)
Dept: OBSTETRICS AND GYNECOLOGY | Facility: CLINIC | Age: 55
End: 2025-07-23
Payer: COMMERCIAL

## 2025-07-23 VITALS
WEIGHT: 130.06 LBS | BODY MASS INDEX: 20.9 KG/M2 | HEIGHT: 66 IN | SYSTOLIC BLOOD PRESSURE: 108 MMHG | DIASTOLIC BLOOD PRESSURE: 68 MMHG

## 2025-07-23 DIAGNOSIS — R39.9 UTI SYMPTOMS: ICD-10-CM

## 2025-07-23 DIAGNOSIS — N95.1 MENOPAUSAL SYMPTOMS: Primary | ICD-10-CM

## 2025-07-23 PROCEDURE — 1159F MED LIST DOCD IN RCRD: CPT | Mod: CPTII,S$GLB,, | Performed by: OBSTETRICS & GYNECOLOGY

## 2025-07-23 PROCEDURE — 99999 PR PBB SHADOW E&M-EST. PATIENT-LVL III: CPT | Mod: PBBFAC,,, | Performed by: OBSTETRICS & GYNECOLOGY

## 2025-07-23 PROCEDURE — 3008F BODY MASS INDEX DOCD: CPT | Mod: CPTII,S$GLB,, | Performed by: OBSTETRICS & GYNECOLOGY

## 2025-07-23 PROCEDURE — 3074F SYST BP LT 130 MM HG: CPT | Mod: CPTII,S$GLB,, | Performed by: OBSTETRICS & GYNECOLOGY

## 2025-07-23 PROCEDURE — 3078F DIAST BP <80 MM HG: CPT | Mod: CPTII,S$GLB,, | Performed by: OBSTETRICS & GYNECOLOGY

## 2025-07-23 PROCEDURE — 99213 OFFICE O/P EST LOW 20 MIN: CPT | Mod: S$GLB,,, | Performed by: OBSTETRICS & GYNECOLOGY

## 2025-07-23 RX ORDER — ESTRADIOL 1 MG/G
1 GEL TOPICAL DAILY
Qty: 30 G | Refills: 11 | Status: SHIPPED | OUTPATIENT
Start: 2025-07-23 | End: 2026-07-23

## 2025-07-23 RX ORDER — HYOSCYAMINE SULFATE 0.12 MG/1
0.12 TABLET SUBLINGUAL EVERY 6 HOURS PRN
COMMUNITY
Start: 2025-06-17

## 2025-07-23 RX ORDER — ESTRADIOL 10 UG/1
INSERT VAGINAL
COMMUNITY
Start: 2025-06-23

## 2025-07-23 RX ORDER — VIT C/E/ZN/COPPR/LUTEIN/ZEAXAN 250MG-90MG
CAPSULE ORAL
COMMUNITY

## 2025-07-23 RX ORDER — SULFAMETHOXAZOLE AND TRIMETHOPRIM 800; 160 MG/1; MG/1
1 TABLET ORAL ONCE AS NEEDED
Qty: 30 TABLET | Refills: 5 | Status: SHIPPED | OUTPATIENT
Start: 2025-07-23 | End: 2025-07-23

## 2025-07-23 RX ORDER — PROGESTERONE 200 MG/1
200 CAPSULE ORAL NIGHTLY
Qty: 30 CAPSULE | Refills: 11 | Status: SHIPPED | OUTPATIENT
Start: 2025-07-23 | End: 2026-07-23

## 2025-07-23 RX ORDER — MULTIVIT-MIN/IRON/FOLIC ACID/K 18-600-40
CAPSULE ORAL
COMMUNITY

## 2025-07-23 NOTE — PROGRESS NOTES
Subjective:       Patient ID: Mandi Juan is a 55 y.o. female.    Chief Complaint:  Consult (Hormones )      History of Present Illness  55 presents with worsening perimenopausal symptoms, including VMS, brain fog, and anxiety. Discontinued GUMARO 10/2024.  Amenorrheic since then.     Also notes 4 episodes of dysuria, urinary frequency, urgency, pink tinged urine that spontaneously resolve.  Tried Imvexxy but been intermittently compliant.  Has been using consistently for the past 6 weeks.  Symptoms most recently occurred last week. Symptoms occurred the day after intercourse.     The 10-year ASCVD risk score (Del BARRIOS, et al., 2019) is: 1%    Values used to calculate the score:      Age: 55 years      Sex: Female      Is Non- : No      Diabetic: No      Tobacco smoker: No      Systolic Blood Pressure: 108 mmHg      Is BP treated: No      HDL Cholesterol: 91 mg/dL      Total Cholesterol: 191 mg/dL      Problem List[1]    Past Medical History:   Diagnosis Date    Abnormal uterine bleeding 21    Anxiety     Very mild    BRCA1 negative     BRCA2 negative     COVID-19     Dyspareunia     20 years    Endometriosis of uterus     15-20 years    GERD (gastroesophageal reflux disease)     Internal hemorrhoids     Irritable bowel syndrome     Migraines     Palpitations     UTI (urinary tract infection)        Past Surgical History:   Procedure Laterality Date     SECTION  1998    COLONOSCOPY      hemorrhoid banding  2022    TONSILLECTOMY         OB History    Para Term  AB Living   2 2 2   3   SAB IAB Ectopic Multiple Live Births      1 2      # Outcome Date GA Lbr Heron/2nd Weight Sex Type Anes PTL Lv   2A Term      CS-Unspec      2B Term            1 Term      Vag-Spont         Obstetric Comments      No abnormal pap   No STDs    x 1, CD x 1  (TIUP 37 weeks)       Patient's last menstrual period was 10/19/2024.   Date of Last Pap: 10/26/2022    Review of  "Systems  Review of Systems   Constitutional:  Negative for chills and fever.   Gastrointestinal:  Positive for constipation, diarrhea and nausea. Negative for abdominal pain and vomiting.   Genitourinary:  Positive for dysuria, frequency and hematuria. Negative for flank pain, pelvic pain, urgency and vaginal discharge.   Musculoskeletal:  Negative for back pain.   Integumentary:  Negative for rash.   Neurological:  Positive for headaches.        Objective:   /68   Ht 5' 6" (1.676 m)   Wt 59 kg (130 lb 1.1 oz)   LMP 10/19/2024   BMI 20.99 kg/m²   Body mass index is 20.99 kg/m².    APPEARANCE: Well nourished, well developed, in no acute distress.  PSYCH: Appropriate mood and affect.  SKIN: No acne or hirsutism  NECK: Neck symmetric without masses or thyromegaly      Results for orders placed or performed in visit on 11/19/24   CBC Auto Differential    Collection Time: 11/19/24 10:01 AM   Result Value Ref Range    WBC 5.54 3.90 - 12.70 K/uL    RBC 4.44 4.00 - 5.40 M/uL    Hemoglobin 14.2 12.0 - 16.0 g/dL    Hematocrit 42.8 37.0 - 48.5 %    MCV 96 82 - 98 fL    MCH 32.0 (H) 27.0 - 31.0 pg    MCHC 33.2 32.0 - 36.0 g/dL    RDW 11.8 11.5 - 14.5 %    Platelets 235 150 - 450 K/uL    MPV 9.6 9.2 - 12.9 fL    Immature Granulocytes 0.2 0.0 - 0.5 %    Gran # (ANC) 4.0 1.8 - 7.7 K/uL    Immature Grans (Abs) 0.01 0.00 - 0.04 K/uL    Lymph # 1.1 1.0 - 4.8 K/uL    Mono # 0.4 0.3 - 1.0 K/uL    Eos # 0.0 0.0 - 0.5 K/uL    Baso # 0.03 0.00 - 0.20 K/uL    nRBC 0 0 /100 WBC    Gran % 71.5 38.0 - 73.0 %    Lymph % 20.4 18.0 - 48.0 %    Mono % 7.2 4.0 - 15.0 %    Eosinophil % 0.4 0.0 - 8.0 %    Basophil % 0.5 0.0 - 1.9 %    Differential Method Automated    FERRITIN    Collection Time: 11/19/24 10:01 AM   Result Value Ref Range    Ferritin 140 20.0 - 300.0 ng/mL       Assessment/ Plan:     1. Menopausal symptoms  estradioL (DIVIGEL) 1 mg/gram (0.1 %) topical gel    progesterone (PROMETRIUM) 200 MG capsule      2. UTI symptoms  " sulfamethoxazole-trimethoprim 800-160mg (BACTRIM DS) 800-160 mg Tab        A full discussion of the benefit-risk ratio of hormonal replacement therapy was carried out. Improvement in vasomotor and other climacteric symptoms were discussed, including possible improvements in sleep and mood. Reduction of risk for osteoporosis was explained. We discussed the study data showing increased risk of thrombo-embolic events such as venous thrombosis and stroke and also possibly breast cancer with estrogen replacement, and how this might affect her. The range of side effects such as breast tenderness, weight gain and including possible increases in lifetime risk of breast cancer and possible thrombotic complications was discussed. We also discussed ACOG's recommendation to use hormone replacement therapy for the relief of hot flashes alone and to be on the lowest dose possible for the shortest amount of time.  Alternative such as herbal and soy-based products were reviewed as well as behavioral modifications and non hormonal prescription medications. All of her questions about this therapy were answered. Start Divigel/Prometrium nightly.     Continue Imvexxy.  Add postcoital Bactrim.         Follow up in about 4 months (around 11/23/2025) for annual well woman exam or as needed.            Eleni Alaniz MD  [facility]  07/23/2025      Answers submitted by the patient for this visit:  Gynecologic Exam Questionnaire  (Submitted on 7/22/2025)  Chief Complaint: Gynecologic exam  genital itching: No  genital lesions: No  genital odor: No  genital rash: No  missed menses: No  vaginal bleeding: No  Chronicity: recurrent  Onset: more than 1 month ago  Frequency: daily  Progression since onset: waxing and waning  Pain severity: mild  Affected side: both  Pregnant now?: No  anorexia: No  discolored urine: Yes  painful intercourse: Yes  Vaginal bleeding: no bleeding  Passing clots?: No  Passing tissue?:  No  Aggravated by: nothing  treatments tried: NSAIDs  Improvement on treatment: no relief  Sexual activity: sexually active  Partner with STD symptoms: no  Birth control: vasectomy  Menstrual history: postmenopausal  STD: No  abdominal surgery: No   section: Yes  Ectopic pregnancy: No  Endometriosis: Yes  herpes simplex: No  gynecological surgery: No  menorrhagia: No  metrorrhagia: No  miscarriage: No  ovarian cysts: Yes  perineal abscess: No  PID: No  terminated pregnancy: No  vaginosis: No         [1]   Patient Active Problem List  Diagnosis    GERD (gastroesophageal reflux disease)    Migraines    Irritable bowel syndrome with constipation    Meningocele spinal    Palpitations    Fatigue

## 2025-08-05 ENCOUNTER — PATIENT MESSAGE (OUTPATIENT)
Dept: OBSTETRICS AND GYNECOLOGY | Facility: CLINIC | Age: 55
End: 2025-08-05
Payer: COMMERCIAL

## 2025-08-05 DIAGNOSIS — N89.8 VAGINAL DRYNESS: Primary | ICD-10-CM

## 2025-08-05 RX ORDER — ESTRADIOL 10 UG/1
1 INSERT VAGINAL
Qty: 8 EACH | Refills: 11 | Status: SHIPPED | OUTPATIENT
Start: 2025-08-07

## 2025-08-21 ENCOUNTER — PROCEDURE VISIT (OUTPATIENT)
Dept: NEUROLOGY | Facility: CLINIC | Age: 55
End: 2025-08-21
Payer: COMMERCIAL

## 2025-08-21 DIAGNOSIS — G43.E19 INTRACTABLE CHRONIC MIGRAINE WITH AURA AND WITHOUT STATUS MIGRAINOSUS: Primary | ICD-10-CM
